# Patient Record
Sex: FEMALE | Race: WHITE | Employment: OTHER | URBAN - METROPOLITAN AREA
[De-identification: names, ages, dates, MRNs, and addresses within clinical notes are randomized per-mention and may not be internally consistent; named-entity substitution may affect disease eponyms.]

---

## 2017-06-27 ENCOUNTER — OFFICE VISIT (OUTPATIENT)
Dept: CARDIOLOGY CLINIC | Age: 77
End: 2017-06-27

## 2017-06-27 VITALS
BODY MASS INDEX: 28.34 KG/M2 | OXYGEN SATURATION: 98 % | HEIGHT: 62 IN | HEART RATE: 76 BPM | WEIGHT: 154 LBS | SYSTOLIC BLOOD PRESSURE: 130 MMHG | DIASTOLIC BLOOD PRESSURE: 80 MMHG | RESPIRATION RATE: 15 BRPM

## 2017-06-27 DIAGNOSIS — I48.20 CHRONIC ATRIAL FIBRILLATION (HCC): Primary | ICD-10-CM

## 2017-06-27 DIAGNOSIS — G47.33 OSA (OBSTRUCTIVE SLEEP APNEA): ICD-10-CM

## 2017-06-27 RX ORDER — PSYLLIUM HUSK 100 %
POWDER (GRAM) MISCELLANEOUS
COMMUNITY
Start: 2016-11-14 | End: 2021-07-13

## 2017-06-27 RX ORDER — AZELASTINE 1 MG/ML
1 SPRAY, METERED NASAL AS NEEDED
COMMUNITY
Start: 2017-05-30

## 2017-06-27 RX ORDER — ALUMINA, MAGNESIA, AND SIMETHICONE 2400; 2400; 240 MG/30ML; MG/30ML; MG/30ML
SUSPENSION ORAL
COMMUNITY
Start: 2016-11-14 | End: 2019-10-25

## 2017-06-27 RX ORDER — DICLOFENAC SODIUM 10 MG/G
4 GEL TOPICAL AS NEEDED
COMMUNITY
Start: 2017-06-23

## 2017-06-27 RX ORDER — POLYETHYLENE GLYCOL 3350 17 G/17G
POWDER, FOR SOLUTION ORAL
COMMUNITY
Start: 2016-11-14 | End: 2019-10-25

## 2017-06-27 NOTE — PROGRESS NOTES
Chief Complaint   Patient presents with    Irregular Heart Beat     A Fib       1. Have you been to the ER, urgent care clinic since your last visit? Hospitalized since your last visit? No    2. Have you seen or consulted any other health care providers outside of the [address] [place] [address] since your last visit? Include any pap smears or colon screening. No    Body mass index is 28.17 kg/(m^2).

## 2017-06-27 NOTE — PROGRESS NOTES
HISTORY OF PRESENT ILLNESS    Sarah Villalpando is a 68 y.o. female. Last seen more than 6 months ago. Problem List  Date Reviewed: 4/6/2016          Codes Class Noted    Bradycardia ICD-10-CM: R00.1  ICD-9-CM: 427.89  12/10/2016        Surgery follow-up ICD-10-CM: W86  ICD-9-CM: V67.00  6/2/2016        Ankle fracture, left ICD-10-CM: L03.818H  ICD-9-CM: 824.8  4/4/2016        NITZA (obstructive sleep apnea) ICD-10-CM: G47.33  ICD-9-CM: 327.23  4/26/2013        Atrial fibrillation ([facility] [address] ICD-10-CM: I48.91  ICD-9-CM: 427.31  Unknown    Overview Signed 2/21/2011  4:06 PM by Dana Andersen MD     Onset 1997  Substrate - unknown. No hx of HTN, diabetes  Stroke prevention - warfarin (Cubbage)                   Cardiac testing   Echo 2d adult 2000 - LVH, EF 60%  Stress test adenosine/cardiolite 2008 - Normal perfusion, EF 69%  Holter 5/18/15 - HR , longest pause 2.3 seconds    HPI     Since her last visit, we reduced Cardizem to every other day dosing, and since then she completely discontinued it at the end of May. Denies any racing or skipped heart beats. No current chest pain. No bleeding issues on Coumadin. She has NITZA and wears CPAP faithfully. She leads an active lifestyle and enjoys doing yard work. Denies any exertional symptoms. No claudication symptoms. Patient denies any exertional chest pain, dyspnea, palpitations, syncope, orthopnea, edema or paroxysmal nocturnal dyspnea. Current Outpatient Prescriptions   Medication Sig Dispense Refill    aluminum & magnesium hydroxide-simethicone (MYLANTA II) 400-400-40 mg/5 mL suspension MYLANTA DOUBLE-STRENGTH 400-400-40 MG/5ML SUSP      azelastine (ASTELIN) 137 mcg (0.1 %) nasal spray 1 [place] by Nasal route.  diclofenac (VOLTAREN) 1 % gel Apply 4 g to affected area.       polyethylene glycol (MIRALAX) 17 gram/dose powder MIRALAX POWD      psyllium husk, bulk, 100 % powd METAMUCIL MULTIHEALTH FIBER PACK      traMADol (ULTRAM) 50 mg tablet Take 1 Tab by mouth every eight (8) hours as needed. Max Daily Amount: 150 mg. Indications: PAIN 20 Tab 0    fish oil-omega-3 fatty acids 340-1,000 mg capsule Take 1 Cap by mouth two (2) times a day.  oxybutynin chloride XL (DITROPAN XL) 15 mg CR tablet Take 15 mg by mouth daily.  potassium chloride SR (KLOR-CON 10) 10 mEq tablet Take 10 mEq by mouth two (2) times a day.  warfarin (COUMADIN) 3 mg tablet Take 3 mg by mouth three (3) days a week. Tuesday, Wednesday, Thursday and Saturday      warfarin (COUMADIN) 6 mg tablet Take 6 mg by mouth two (2) days a week. Sunday, Monday, Friday      atorvastatin (LIPITOR) 20 mg tablet Take 20 mg by mouth daily. Takes with dinner.  ranitidine (ZANTAC) 150 mg tablet Take 150 mg by mouth two (2) times a day.  tiZANidine (ZANAFLEX) 4 mg tablet Take 4 mg by mouth as needed.  magnesium oxide (MAG-OX) 400 mg tablet Take 400 mg by mouth daily (with dinner).  GLUCOSAMINE HCL/CHONDR GA A NA (GLUCOSAMINE-CHONDROITIN) 750-600 mg tab Take 1 Tab by mouth two (2) times a day.  tuauz-fqgtr-bos tea-EGCG-dig#3 50-20-80-40 mg-mcg-mg-mg cap Take 100 mg by mouth daily.  Cholecalciferol, Vitamin D3, (VITAMIN D3) 1,000 unit cap Take 1,000 Units by mouth two (2) times a day.  acetaminophen (TYLENOL ARTHRITIS PAIN) 650 mg CR tablet Take 650 mg by mouth every six (6) hours as needed.  multivitamin (MULTIPLE VITAMINS) tablet Take 1 Tab by mouth daily.  PYRIDOXINE HCL (VITAMIN B-6 PO) Take 100 mg by mouth daily.  CYANOCOBALAMIN, VITAMIN B-12, (VITAMIN B-12 PO) Take 1,000 mg by mouth daily. Allergies   Allergen Reactions    Latex Itching    Adhesive Other (comments)    Codeine Other (comments)     Incoherent/slept     Lives in [facility]. Nonsmoker. Retired. Review of Systems   Constitutional: Negative for chills, weight loss, malaise/fatigue and diaphoresis.    Respiratory: Negative for cough, hemoptysis, sputum production, shortness of breath and wheezing. Cardiovascular: Negative for chest pain, palpitations, orthopnea, claudication, leg swelling and PND. Gastrointestinal: Negative for heartburn, nausea, vomiting, blood in stool and melena. Genitourinary: Negative for dysuria, hematuria and flank pain. Musculoskeletal: Negative for falls. Skin: Negative for rash. Neurological: Negative for focal weakness, seizures, loss of consciousness, weakness and headaches. Endo/Heme/Allergies: Does not bruise/bleed easily. Visit Vitals    /80 (BP 1 Location: Left arm, BP Patient Position: Sitting)    Pulse 76    Resp 15    Ht 5' 2\" (1.575 m)    Wt 154 lb (69.9 kg)    SpO2 98%    BMI 28.17 kg/m2      Wt Readings from Last 3 Encounters:   06/27/17 154 lb (69.9 kg)   12/07/16 156 lb (70.8 kg)   06/01/16 156 lb 4.9 oz (70.9 kg)        Physical Exam   Vitals reviewed. Constitutional: She is oriented to person, place, and time. She appears well-developed. HENT:   Head: Normocephalic. Neck: Neck supple. No JVD present. No tracheal deviation present. Cardiovascular: Normal rate, S1 normal and S2 normal.  An irregularly irregular rhythm present. PMI is not displaced. Exam reveals no gallop and no friction rub. No murmur heard. Pulses:       Carotid pulses are 2+ on the right side, and 2+ on the left side. Abdominal: Soft. Bowel sounds are normal. No tenderness. She has no rebound. Musculoskeletal: She exhibits no edema. Neurological: She is alert and oriented to person, place, and time. Skin: Skin is warm and dry. Psychiatric: She has a normal mood and affect. Cardiographics:  EKG 5/1/14- AF, rate 70s  EKG 5/4/15 - AF, 50s  Holter 5/18/15 - HR , longest pause 2.3 seconds  EKG April 2016 - AF 50-60s   EKG 06/27/17- AF 80s, incomplete RBBB     ASSESSMENT and PLAN  Encounter Diagnoses   Name Primary?     Chronic atrial fibrillation (HCC) Yes    NITZA (obstructive sleep apnea)       Mrs Ariana Jones has chronic AF rate controlled off Cardizem for the past 2 months. 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 I encouraged her to monitor her heart rate periodically. No issues with anitcoagulation. INR levels monitored by Mindy Moyer. Will extend follow up to 1 year. Follow-up Disposition:  Return in about 1 year (around 6/27/2018).      Written by Oli Diana, as dictated by Malia Dent MD.   Malia Dent MD

## 2017-06-27 NOTE — MR AVS SNAPSHOT
Visit Information Date & Time Provider Department Dept. Phone Encounter #  
 6/27/2017  3:00 PM Malia Dent MD [facility] [phone] [id number] Follow-up Instructions Return in about 1 year (around 6/27/2018). Upcoming Health Maintenance Date Due DTaP/Tdap/Td series (1 - Tdap) 12/14/1961 ZOSTER VACCINE AGE 60> 12/14/2000 GLAUCOMA SCREENING Q2Y 12/14/2005 OSTEOPOROSIS SCREENING (DEXA) 12/14/2005 Pneumococcal 65+ Low/Medium Risk (1 of 2 - PCV13) 12/14/2005 MEDICARE YEARLY EXAM 12/14/2005 INFLUENZA AGE 9 TO ADULT 8/1/2017 Allergies as of 6/27/2017  Review Complete On: 6/27/2017 By: Delma Solis LPN Severity Noted Reaction Type Reactions Latex  04/06/2016    Itching Adhesive  06/01/2016    Other (comments) Codeine  01/27/2011   Side Effect Other (comments) Incoherent/slept Current Immunizations  Reviewed on 4/8/2016 No immunizations on file. Not reviewed this visit You Were Diagnosed With   
  
 Codes Comments Chronic atrial fibrillation (HCC)    -  Primary ICD-10-CM: X10.0 ICD-9-CM: 427.31   
 NITZA (obstructive sleep apnea)     ICD-10-CM: G47.33 
ICD-9-CM: 327.23 Vitals BP Pulse Resp Height(growth percentile) Weight(growth percentile) SpO2  
 130/80 (BP 1 Location: Left arm, BP Patient Position: Sitting) 76 15 5' 2\" (1.575 m) 154 lb (69.9 kg) 98% BMI Smoking Status 28.17 kg/m2 Former Smoker Vitals History BMI and BSA Data Body Mass Index Body Surface Area  
 28.17 kg/m 2 1.75 m 2 Preferred Pharmacy Pharmacy Name Phone [address] [facility] [phone] Your Updated Medication List  
  
   
This list is accurate as of: 6/27/17  3:30 PM.  Always use your most recent med list.  
  
  
  
  
 aluminum & magnesium hydroxide-simethicone 400-400-40 mg/5 mL suspension Commonly known as:  MYLANTA II  
MYLANTA DOUBLE-STRENGTH 400-400-40 MG/5ML SUSP  
  
 atorvastatin 20 mg tablet Commonly known as:  LIPITOR Take 20 mg by mouth daily. Takes with dinner. azelastine 137 mcg (0.1 %) nasal spray Commonly known as:  ASTELIN  
1 Spray by Nasal route. diclofenac 1 % Gel Commonly known as:  VOLTAREN Apply 4 g to affected area. fish oil-omega-3 fatty acids 340-1,000 mg capsule Take 1 Cap by mouth two (2) times a day. glucosamine-chondroitin 750-600 mg Tab Take 1 Tab by mouth two (2) times a day. magnesium oxide 400 mg tablet Commonly known as:  MAG-OX Take 400 mg by mouth daily (with dinner). MULTIPLE VITAMINS tablet Generic drug:  multivitamin Take 1 Tab by mouth daily. oxybutynin chloride XL 15 mg CR tablet Commonly known as:  DITROPAN XL Take 15 mg by mouth daily. polyethylene glycol 17 gram/dose powder Commonly known as:  Isabella Marlo MIRALAX POWD  
  
 potassium chloride SR 10 mEq tablet Commonly known as:  KLOR-CON 10 Take 10 mEq by mouth two (2) times a day. psyllium husk (bulk) 100 % Powd METAMUCIL MULTIHEALTH FIBER PACK  
  
 raNITIdine 150 mg tablet Commonly known as:  ZANTAC Take 150 mg by mouth two (2) times a day. tebspbo-pbqjb-gs. tea-EGCG-dig3 50-20-80-40 mg-mcg-mg-mg Cap Take 100 mg by mouth daily. tiZANidine 4 mg tablet Commonly known as:  Alejandro Alves Take 4 mg by mouth as needed. traMADol 50 mg tablet Commonly known as:  ULTRAM  
Take 1 Tab by mouth every eight (8) hours as needed. Max Daily Amount: 150 mg. Indications: PAIN  
  
 TYLENOL ARTHRITIS PAIN 650 mg CR tablet Generic drug:  acetaminophen Take 650 mg by mouth every six (6) hours as needed. VITAMIN B-12 PO Take 1,000 mg by mouth daily. VITAMIN B-6 PO Take 100 mg by mouth daily. VITAMIN D3 1,000 unit Cap Generic drug:  cholecalciferol Take 1,000 Units by mouth two (2) times a day. * warfarin 3 mg tablet Commonly known as:  COUMADIN Take 3 mg by mouth three (3) days a week. Tuesday, Wednesday, Thursday and Saturday * warfarin 6 mg tablet Commonly known as:  COUMADIN Take 6 mg by mouth two (2) days a week. Sunday, Monday, Friday * Notice: This list has 2 medication(s) that are the same as other medications prescribed for you. Read the directions carefully, and ask your doctor or other care provider to review them with you. We Performed the Following AMB POC EKG ROUTINE W/ 12 LEADS, INTER & REP [94146 CPT(R)] Follow-up Instructions Return in about 1 year (around 6/27/2018). Patient Instructions Stay off Cardizem completely. Introducing [facility]! Dear Nadine Al: Thank you for requesting a [employer] account. Our records indicate that you already have an active [employer] account. You can access your account anytime at [email or web address] Did you know that you can access your hospital and ER discharge instructions at any time in [employer]? You can also review all of your test results from your hospital stay or ER visit. Additional Information If you have questions, please visit the Frequently Asked Questions section of the [employer] website at [email or web address] Remember, [employer] is NOT to be used for urgent needs. For medical emergencies, dial 911. Now available from your iPhone and Android! Please provide this summary of care documentation to your next provider. Your primary care clinician is listed as Darrel Sanchez. If you have any questions after today's visit, please call [phone].

## 2017-06-29 ENCOUNTER — TELEPHONE (OUTPATIENT)
Dept: CARDIOLOGY CLINIC | Age: 77
End: 2017-06-29

## 2018-11-13 ENCOUNTER — OFFICE VISIT (OUTPATIENT)
Dept: CARDIOLOGY CLINIC | Age: 78
End: 2018-11-13

## 2018-11-13 VITALS
DIASTOLIC BLOOD PRESSURE: 82 MMHG | RESPIRATION RATE: 16 BRPM | HEART RATE: 84 BPM | SYSTOLIC BLOOD PRESSURE: 144 MMHG | WEIGHT: 148.2 LBS | HEIGHT: 62 IN | BODY MASS INDEX: 27.27 KG/M2

## 2018-11-13 DIAGNOSIS — G47.33 OSA (OBSTRUCTIVE SLEEP APNEA): ICD-10-CM

## 2018-11-13 DIAGNOSIS — I48.20 CHRONIC ATRIAL FIBRILLATION (HCC): Primary | ICD-10-CM

## 2018-11-13 NOTE — PROGRESS NOTES
Visit Vitals  /82 (BP 1 Location: Left arm)   Pulse 84   Resp 16   Ht 5' 2\" (1.575 m)   Wt 148 lb 3.2 oz (67.2 kg)   BMI 27.11 kg/m²       Patient is here for follow. No cardiac complaints. Doing well.

## 2018-11-13 NOTE — PROGRESS NOTES
HISTORY OF PRESENT ILLNESS    Jose Luis Medrano is a 68 y.o. female. Last seen 1.5 years ago. Problem List  Date Reviewed: 11/13/2018          Codes Class Noted    NITZA (obstructive sleep apnea) ICD-10-CM: W71.71  ICD-9-CM: 327.23  4/26/2013        Atrial fibrillation ([facility] [address] ICD-10-CM: I48.91  ICD-9-CM: 427.31  Unknown    Overview Signed 2/21/2011  4:06 PM by Jasvir Gottlieb MD     Onset 1997  Substrate - unknown. No hx of HTN, diabetes  Stroke prevention - warfarin (Cubbage)                   Cardiac testing   Echo 2d adult 2000 - LVH, EF 60%  Stress test adenosine/cardiolite 2008 - Normal perfusion, EF 69%  Holter 5/18/15 - HR , longest pause 2.3 seconds    HPI  Ms. Jordan Ling states she is doing well. She remains active during the day with no exertional symptoms. She was unable to walk as much as she would have liked this summer due to mosquitos and rain. Patient denies any dizziness, lightheadedness, fatigue, exertional chest pain, dyspnea, palpitations, syncope, orthopnea, edema or paroxysmal nocturnal dyspnea. She has had no bleeding concerns on Coumadin. She is followed by Rashida Darling for routine lab work. She is interested in seeing another cardiologist, as she states she is unable to drive to appointments. Current Outpatient Medications   Medication Sig Dispense Refill    acetaminophen (TYLENOL) 325 mg tablet Take  by mouth every four (4) hours as needed for Pain.  azelastine (ASTELIN) 137 mcg (0.1 %) nasal spray 1 [place] by Nasal route as needed.  diclofenac (VOLTAREN) 1 % gel Apply 4 g to affected area as needed.  fish oil-omega-3 fatty acids 340-1,000 mg capsule Take 1 Cap by mouth two (2) times a day.  oxybutynin chloride XL (DITROPAN XL) 15 mg CR tablet Take 15 mg by mouth daily.  potassium chloride SR (KLOR-CON 10) 10 mEq tablet Take 10 mEq by mouth two (2) times a day.  warfarin (COUMADIN) 3 mg tablet Take 3 mg by mouth three (3) days a week.  Sunday, Tuesday, Wednesday, Thursday and Saturday      warfarin (COUMADIN) 6 mg tablet Take 6 mg by mouth two (2) days a week. Monday, Friday      atorvastatin (LIPITOR) 20 mg tablet Take 20 mg by mouth every evening.  ranitidine (ZANTAC) 150 mg tablet Take 150 mg by mouth two (2) times a day.  magnesium oxide (MAG-OX) 400 mg tablet Take 400 mg by mouth daily (with dinner).  GLUCOSAMINE HCL/CHONDR GA A NA (GLUCOSAMINE-CHONDROITIN) 750-600 mg tab Take 1 Tab by mouth two (2) times a day.  Cholecalciferol, Vitamin D3, (VITAMIN D3) 1,000 unit cap Take 1,000 Units by mouth two (2) times a day.  multivitamin (MULTIPLE VITAMINS) tablet Take 1 Tab by mouth daily.  PYRIDOXINE HCL (VITAMIN B-6 PO) Take 100 mg by mouth daily.  CYANOCOBALAMIN, VITAMIN B-12, (VITAMIN B-12 PO) Take 1,000 mg by mouth daily.  aluminum & magnesium hydroxide-simethicone (MYLANTA II) 400-400-40 mg/5 mL suspension MYLANTA DOUBLE-STRENGTH 400-400-40 MG/5ML SUSP      polyethylene glycol (MIRALAX) 17 gram/dose powder MIRALAX POWD      psyllium husk, bulk, 100 % powd METAMUCIL MULTIHEALTH FIBER PACK      traMADol (ULTRAM) 50 mg tablet Take 1 Tab by mouth every eight (8) hours as needed. Max Daily Amount: 150 mg. Indications: PAIN 20 Tab 0    tiZANidine (ZANAFLEX) 4 mg tablet Take 4 mg by mouth as needed.  lvnpt-vlvfl-xcz tea-EGCG-dig#3 50-20-80-40 mg-mcg-mg-mg cap Take 100 mg by mouth daily.  acetaminophen (TYLENOL ARTHRITIS PAIN) 650 mg CR tablet Take 650 mg by mouth every six (6) hours as needed. Allergies   Allergen Reactions    Latex Itching    Adhesive Other (comments)    Codeine Other (comments)     Incoherent/slept     Lives in [facility]. Nonsmoker. Retired. Review of Systems   Constitutional: Negative for chills, weight loss, malaise/fatigue and diaphoresis. Respiratory: Negative for cough, hemoptysis, sputum production, shortness of breath and wheezing.     Cardiovascular: Negative for chest pain, palpitations, orthopnea, claudication, leg swelling and PND. Gastrointestinal: Negative for heartburn, nausea, vomiting, blood in stool and melena. Genitourinary: Negative for dysuria, hematuria and flank pain. Musculoskeletal: Negative for falls. Skin: Negative for rash. Neurological: Negative for focal weakness, seizures, loss of consciousness, weakness and headaches. Endo/Heme/Allergies: Does not bruise/bleed easily. Visit Vitals  /82 (BP 1 Location: Left arm)   Pulse 84   Resp 16   Ht 5' 2\" (1.575 m)   Wt 148 lb 3.2 oz (67.2 kg)   BMI 27.11 kg/m²      Wt Readings from Last 3 Encounters:   11/13/18 148 lb 3.2 oz (67.2 kg)   06/27/17 154 lb (69.9 kg)   12/07/16 156 lb (70.8 kg)        Physical Exam   Vitals reviewed. Constitutional: She is oriented to person, place, and time. She appears well-developed. HENT:   Head: Normocephalic. Neck: Neck supple. No JVD present. No tracheal deviation present. Cardiovascular: Normal rate, S1 normal and S2 normal.  An irregularly irregular rhythm present. PMI is not displaced. Exam reveals no gallop and no friction rub. No murmur heard. Pulses:       Carotid pulses are 2+ on the right side, and 2+ on the left side. Abdominal: Soft. Bowel sounds are normal. No tenderness. She has no rebound. Musculoskeletal: She exhibits no edema. Neurological: She is alert and oriented to person, place, and time. Skin: Skin is warm and dry. Psychiatric: She has a normal mood and affect. Cardiographics:  EKG 5/1/14- AF, rate 70s  EKG 5/4/15 - AF, 50s  Holter 5/18/15 - HR , longest pause 2.3 seconds  EKG April 2016 - AF 50-60s   EKG 06/27/17- AF 80s, incomplete RBBB   EKG 11/13/18 - AF 78    ASSESSMENT and PLAN  Encounter Diagnoses   Name Primary?  Chronic atrial fibrillation (HCC) Yes    NITZA (obstructive sleep apnea)       Mrs Cyril Mason has chronic AF, rate controlled on no AV cruz drugs.  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 I encouraged her to monitor her heart rate periodically. No issues with anitcoagulation. INR levels monitored by Lyn Larson She would like to follow up with a cardiologist in the [address] [place] [address] region. Will coordinate accordingly. Written by Aby Ford, as dictated by Dr. Luis Anderson.      Luis Anderson MD

## 2018-11-16 ENCOUNTER — DOCUMENTATION ONLY (OUTPATIENT)
Dept: CARDIOLOGY CLINIC | Age: 78
End: 2018-11-16

## 2018-11-16 NOTE — PROGRESS NOTES
Cardiology [facility] note:  Asked by provider to help with assisting with coordination to transfer cardiology svs to [facility] provider in [place]. Printed the contact information for [facility]- for Dr. Kristan Dominguez- he sees patients down in the [facility] office in [place]. Mailed this out to her to review, contact for an appointment.

## 2019-10-25 ENCOUNTER — OFFICE VISIT (OUTPATIENT)
Dept: CARDIOLOGY CLINIC | Age: 79
End: 2019-10-25

## 2019-10-25 VITALS
BODY MASS INDEX: 27.42 KG/M2 | HEART RATE: 74 BPM | RESPIRATION RATE: 14 BRPM | SYSTOLIC BLOOD PRESSURE: 136 MMHG | HEIGHT: 62 IN | OXYGEN SATURATION: 96 % | WEIGHT: 149 LBS | DIASTOLIC BLOOD PRESSURE: 78 MMHG

## 2019-10-25 DIAGNOSIS — E78.5 DYSLIPIDEMIA: ICD-10-CM

## 2019-10-25 DIAGNOSIS — G47.33 OSA (OBSTRUCTIVE SLEEP APNEA): ICD-10-CM

## 2019-10-25 DIAGNOSIS — I10 ESSENTIAL HYPERTENSION: ICD-10-CM

## 2019-10-25 DIAGNOSIS — I48.11 LONGSTANDING PERSISTENT ATRIAL FIBRILLATION (HCC): Primary | ICD-10-CM

## 2019-10-25 PROBLEM — K21.00 GASTROESOPHAGEAL REFLUX DISEASE WITH ESOPHAGITIS: Status: ACTIVE | Noted: 2019-10-25

## 2019-10-25 PROBLEM — M15.9 OSTEOARTHRITIS OF MULTIPLE JOINTS: Status: ACTIVE | Noted: 2019-10-25

## 2019-10-25 RX ORDER — ACETAMINOPHEN 325 MG/1
TABLET ORAL
COMMUNITY
End: 2020-09-02 | Stop reason: SDUPTHER

## 2019-10-25 RX ORDER — FAMOTIDINE 20 MG/1
20 TABLET, FILM COATED ORAL 2 TIMES DAILY
COMMUNITY

## 2019-10-25 NOTE — PROGRESS NOTES
Karan Glasgow is a 66 y.o. female is here to establish local cardiac care. Followed at [place] [address] by Dr. Lindsey Douglas, previously followed by Dr. Tra Eduardo for Cardiology in [place]. Hx persistent/chronic afib--rate controlled  (no AV cruz drugs), on anticoagulation, hypertension, dyslipidemia, NITZA, DJD, GERD/Starr's. Recent OV/labs with PCP 10/2/19--chol 121, LDL 52, HDL 59, TG 51 on statin. fishoil. BP well controlled, PT/INR therapeutic on warfarin. Some hip bursiits, arthritis sx (hand). No current CV sx or complaints, other than mild dizziness on occasion, often when first standing up. . Last Holter 2015, no recent Echo. Physically active. The patient denies chest pain/ shortness of breath, orthopnea, PND, LE edema, palpitations, syncope, presyncope or fatigue. Patient Active Problem List    Diagnosis Date Noted    Dyslipidemia 10/25/2019    Osteoarthritis of multiple joints 10/25/2019    Gastroesophageal reflux disease with esophagitis 10/25/2019    HTN (hypertension)     NITZA (obstructive sleep apnea) 04/26/2013    Longstanding persistent atrial fibrillation       Lorenzo Lam  Past Medical History:   Diagnosis Date    Atrial fibrillation ([facility] [address]     onset 1997, chronic. No structural or ischemic heart disease    Starr esophagus     DDD (degenerative disc disease)     DJD (degenerative joint disease)     GERD (gastroesophageal reflux disease)     HTN (hypertension)     Menopause     1981    NITZA (obstructive sleep apnea) dx 20123    CPAP    NITZA (obstructive sleep apnea)       Past Surgical History:   Procedure Laterality Date    ECHO 2D ADULT  2000    LVH, EF 60%    HX OOPHORECTOMY Right     1980    HX OTHER SURGICAL      cyst removed from mouth    STRESS TEST ADENOSINE/MYOVIEW  2008    Normal perfusion, EF 69%     Allergies   Allergen Reactions    Latex Itching    Adhesive Other (comments)    Codeine Other (comments)     Incoherent/slept      History reviewed.  No pertinent family history. Social History     Socioeconomic History    Marital status: SINGLE     Spouse name: Not on file    Number of children: Not on file    Years of education: Not on file    Highest education level: Not on file   Occupational History    Not on file   Social Needs    Financial resource strain: Not on file    Food insecurity:     Worry: Not on file     Inability: Not on file    Transportation needs:     Medical: Not on file     Non-medical: Not on file   Tobacco Use    Smoking status: Former Smoker    Smokeless tobacco: Never Used   Substance and Sexual Activity    Alcohol use: Yes     Alcohol/week: 0.8 standard drinks     Types: 1 Glasses of wine per week     Comment: rare    Drug use: No    Sexual activity: Not on file   Lifestyle    Physical activity:     Days per week: Not on file     Minutes per session: Not on file    Stress: Not on file   Relationships    Social connections:     Talks on phone: Not on file     Gets together: Not on file     Attends Alevism service: Not on file     Active member of club or organization: Not on file     Attends meetings of clubs or organizations: Not on file     Relationship status: Not on file    Intimate partner violence:     Fear of current or ex partner: Not on file     Emotionally abused: Not on file     Physically abused: Not on file     Forced sexual activity: Not on file   Other Topics Concern    Not on file   Social History Narrative    Not on file      Current Outpatient Medications   Medication Sig    acetaminophen (TYLENOL) 325 mg tablet Take  by mouth every four (4) hours as needed for Pain.  famotidine (PEPCID) 20 mg tablet Take 20 mg by mouth two (2) times a day.  acetaminophen (TYLENOL) 325 mg tablet Take  by mouth every four (4) hours as needed for Pain.  azelastine (ASTELIN) 137 mcg (0.1 %) nasal spray 1 [place] by Nasal route as needed.  diclofenac (VOLTAREN) 1 % gel Apply 4 g to affected area as needed.  oxybutynin chloride XL (DITROPAN XL) 15 mg CR tablet Take 15 mg by mouth daily.  potassium chloride SR (KLOR-CON 10) 10 mEq tablet Take 10 mEq by mouth two (2) times a day.  warfarin (COUMADIN) 3 mg tablet Take 3 mg by mouth three (3) days a week. Sunday, Tuesday, Wednesday, Thursday and Saturday    warfarin (COUMADIN) 6 mg tablet Take 6 mg by mouth two (2) days a week. Monday, Friday    atorvastatin (LIPITOR) 20 mg tablet Take 20 mg by mouth every evening.  magnesium oxide (MAG-OX) 400 mg tablet Take 400 mg by mouth daily (with dinner).  Cholecalciferol, Vitamin D3, (VITAMIN D3) 1,000 unit cap Take 1,000 Units by mouth two (2) times a day.  multivitamin (MULTIPLE VITAMINS) tablet Take 1 Tab by mouth daily.  PYRIDOXINE HCL (VITAMIN B-6 PO) Take 100 mg by mouth daily.  CYANOCOBALAMIN, VITAMIN B-12, (VITAMIN B-12 PO) Take 1,000 mg by mouth daily.  psyllium husk, bulk, 100 % powd METAMUCIL MULTIHEALTH FIBER PACK     No current facility-administered medications for this visit. Review of Symptoms:    CONST  No weight change. No fever, chills, sweats    ENT No visual changes, URI sx, sore throat    CV  See HPI   RESP  No cough, or sputum, wheezing. Also see HPI   GI  No abdominal pain or change in bowel habits. No heartburn or dysphagia. No melena or rectal bleeding.   No dysuria, urgency, frequency, hematuria   MSKEL  No joint pain, swelling. No muscle pain. SKIN  No rash or lesions. NEURO  No headache, syncope, or seizure. No weakness, loss of sensation, or paresthesias. PSYCH  No low mood or depression  No anxiety. HE/LYMPH  No easy bruising, abnormal bleeding, or enlarged glands.         Physical ExamPhysical Exam:    Visit Vitals  /78 (BP 1 Location: Left arm, BP Patient Position: Sitting)   Pulse 74   Resp 14   Ht 5' 2\" (1.575 m)   Wt 149 lb (67.6 kg)   SpO2 96%   BMI 27.25 kg/m²     Gen: NAD  HEENT:  PERRL, throat clear  Neck: no adenopathy, no thyromegaly, no JVD   Heart:  irregular,Nl S1S2,  no murmur, gallop or rub.   Lungs:  clear  Abdomen:   Soft, non-tender, bowel sounds are active.   Extremities:  No edema  Pulse: symmetric  Neuro: A&O times 3, No focal neuro deficits    Cardiographics    ECG: afib HR 72, RSR', no change from previous      Labs:   Lab Results   Component Value Date/Time    Sodium 138 04/08/2016 02:14 PM    Sodium 136 04/05/2016 05:51 AM    Potassium 3.9 04/08/2016 02:14 PM    Potassium 3.9 04/05/2016 05:51 AM    Chloride 103 04/08/2016 02:14 PM    Chloride 104 04/05/2016 05:51 AM    CO2 29 04/08/2016 02:14 PM    CO2 26 04/05/2016 05:51 AM    Anion gap 6 04/08/2016 02:14 PM    Anion gap 6 04/05/2016 05:51 AM    Glucose 120 (H) 04/08/2016 02:14 PM    Glucose 108 (H) 04/05/2016 05:51 AM    BUN 12 04/08/2016 02:14 PM    BUN 8 04/05/2016 05:51 AM    Creatinine 0.64 04/08/2016 02:14 PM    Creatinine 0.47 (L) 04/05/2016 05:51 AM    BUN/Creatinine ratio 19 04/08/2016 02:14 PM    BUN/Creatinine ratio 17 04/05/2016 05:51 AM    GFR est AA >60 04/08/2016 02:14 PM    GFR est AA >60 04/05/2016 05:51 AM    GFR est non-AA >60 04/08/2016 02:14 PM    GFR est non-AA >60 04/05/2016 05:51 AM    Calcium 9.2 04/08/2016 02:14 PM    Calcium 9.6 04/05/2016 05:51 AM    Bilirubin, total 0.8 04/05/2016 05:51 AM    AST (SGOT) 23 04/05/2016 05:51 AM    Alk. phosphatase 67 04/05/2016 05:51 AM    Protein, total 7.3 04/05/2016 05:51 AM    Albumin 3.6 04/05/2016 05:51 AM    Globulin 3.7 04/05/2016 05:51 AM    A-G Ratio 1.0 (L) 04/05/2016 05:51 AM    ALT (SGPT) 36 04/05/2016 05:51 AM     No results found for: CPK, CPKX, CPX  No results found for: CHOL, CHOLX, CHLST, CHOLV, 630595, HDL, HDLP, LDL, LDLC, DLDLP, TGLX, TRIGL, TRIGP, CHHD, CHHDX  No results found for this or any previous visit.     Assessment:         Patient Active Problem List    Diagnosis Date Noted    Dyslipidemia 10/25/2019    Osteoarthritis of multiple joints 10/25/2019    Gastroesophageal reflux disease with esophagitis 10/25/2019    HTN (hypertension)     NITZA (obstructive sleep apnea) 04/26/2013    Longstanding persistent atrial fibrillation      Followed at [facility] by Dr. Chapis Rios, previously followed by Dr. Jose Miguel West for Cardiology in [place]. Hx persistent/chronic afib--rate controlled  (no AV cruz drugs), on anticoagulation, hypertension, dyslipidemia, NITZA, DJD, GERD/Starr's. Recent OV/labs with PCP 10/2/19--chol 121, LDL 52, HDL 59, TG 51 on statin. fishoil. BP well controlled, PT/INR therapeutic on warfarin. Some hip bursiits, arthritis sx (hand). . Last Holter 2015, no recent Echo. Mild dizziness at times. Physically active. Plan:     Echo/doppler--persistent to chronic afib, hypertension, NITZA  Holter--persistent to chronic afib, dizziness, not on any cruz blocking drugs (r/o sick sinus, progressive AV cruz disease etc)  Lipids/labs per PCP  Continue current rx incl warfarin   Continue current care and f/u in 6 months.     Cam Migeul MD

## 2019-10-25 NOTE — LETTER
10/25/19 Patient: Trinh Hinojosa YOB: 1940 Date of Visit: 10/25/2019 Gary Love MD 
30 [facility] [address] [place] [address] VIA Facsimile: [phone] Dear Gary Love MD, Thank you for referring Ms. Mckenna Davila to [facility] for evaluation. My notes for this consultation are attached. If you have questions, please do not hesitate to call me. I look forward to following your patient along with you.  
 
 
Sincerely, 
 
Kristal Ramirez MD

## 2019-10-25 NOTE — PROGRESS NOTES
PATIENT ID VERIFIED WITH TWO PATIENT IDENTIFIERS. PATIENT MEDICATIONS REVIEWED AND APPROVED BY DR. Mega Alarcon. MEDICATIONS THAT WERE REMOVED FROM THIS VISIT HAVE BEEN APPROVED BY DR. Mega Alarcon. REMOVED:  TRAMADOL, TYLENOL ARTHRITIS, MYLANTA, FISH OIL-OMEGA 3, GLUCOSAMINE/CHONDROITIN, MIRALAX, ZANTAC, [id number] TEA, TIZANIDINE  Chief Complaint   Patient presents with   [facility] patient evaluation referred by Dr. Tenisha Rizvi Irregular Heart Beat    Hypertension    Cholesterol Problem       1. Have you been to the ER, urgent care clinic since your last visit? Hospitalized since your last visit? New patient evaluation    2. Have you seen or consulted any other health care providers outside of the [address] [place] [address] since your last visit? Include any pap smears or colon screening.  New patient evaluation

## 2019-11-11 ENCOUNTER — TELEPHONE (OUTPATIENT)
Dept: CARDIOLOGY CLINIC | Age: 79
End: 2019-11-11

## 2019-11-11 NOTE — TELEPHONE ENCOUNTER
----- Message from Tiny Mills MD sent at 11/8/2019  3:49 PM EST -----  Regarding: echo  Advise echo shows normal LV pumping function, mild valve abnormalities--no concerns. Left atrium dilated--goes along with long-standing afib. Thanks [facility]    Spoke with the patient. Verified patient with two patient identifiers. Results given and questions answered. Patient verbalized understanding.

## 2019-11-21 ENCOUNTER — CLINICAL SUPPORT (OUTPATIENT)
Dept: CARDIOLOGY CLINIC | Age: 79
End: 2019-11-21

## 2019-11-21 DIAGNOSIS — I48.11 LONGSTANDING PERSISTENT ATRIAL FIBRILLATION (HCC): ICD-10-CM

## 2019-11-21 NOTE — PROGRESS NOTES
24 hour Holter monitor only. Verified patient with two patient identifiers. Pt verbalized understanding of its use. [facility]  Reason: Longstanding persistent atrial fibrillation [I48.11 (ICD-10-CM)]  Start time: 9:48am  Return date: 11/22/19        No LOS.

## 2019-11-22 ENCOUNTER — DOCUMENTATION ONLY (OUTPATIENT)
Dept: CARDIOLOGY CLINIC | Age: 79
End: 2019-11-22

## 2019-11-25 ENCOUNTER — TELEPHONE (OUTPATIENT)
Dept: CARDIOLOGY CLINIC | Age: 79
End: 2019-11-25

## 2019-11-25 NOTE — TELEPHONE ENCOUNTER
----- Message from Houston Sanchez MD sent at 11/25/2019 12:55 PM EST -----  Regarding: Holter  Advise afib throughout with HR , avg 76, occasional PVC's. No clear indication for pacemaker or other rx at this point. May eventually need pacemaker but not at present. Thanks [facility]    6 mo fu needed.

## 2019-11-25 NOTE — TELEPHONE ENCOUNTER
Spoke with the patient. Verified patient with two patient identifiers. Results given and questions answered. Patient verbalized understanding.     Appt:  Tuesday, May 26, 2020 01:00 PM

## 2019-11-25 NOTE — TELEPHONE ENCOUNTER
Patient returned your call please call. She will be at this number for the next hour.   [phone]

## 2019-11-25 NOTE — TELEPHONE ENCOUNTER
----- Message from Ethan Wright MD sent at 11/25/2019 12:55 PM EST ----- Regarding: Holter Advise afib throughout with HR , avg 76, occasional PVC's. No clear indication for pacemaker or other rx at this point. May eventually need pacemaker but not at present. Thanks [facility] 
 
6 mo fu needed.

## 2020-09-02 ENCOUNTER — OFFICE VISIT (OUTPATIENT)
Dept: CARDIOLOGY CLINIC | Age: 80
End: 2020-09-02

## 2020-09-02 VITALS
DIASTOLIC BLOOD PRESSURE: 78 MMHG | WEIGHT: 152 LBS | OXYGEN SATURATION: 98 % | BODY MASS INDEX: 27.97 KG/M2 | TEMPERATURE: 98.1 F | HEIGHT: 62 IN | SYSTOLIC BLOOD PRESSURE: 134 MMHG | HEART RATE: 80 BPM | RESPIRATION RATE: 16 BRPM

## 2020-09-02 DIAGNOSIS — K21.00 GASTROESOPHAGEAL REFLUX DISEASE WITH ESOPHAGITIS: ICD-10-CM

## 2020-09-02 DIAGNOSIS — E78.5 DYSLIPIDEMIA: ICD-10-CM

## 2020-09-02 DIAGNOSIS — M15.9 PRIMARY OSTEOARTHRITIS INVOLVING MULTIPLE JOINTS: ICD-10-CM

## 2020-09-02 DIAGNOSIS — I48.11 LONGSTANDING PERSISTENT ATRIAL FIBRILLATION (HCC): Primary | ICD-10-CM

## 2020-09-02 DIAGNOSIS — G47.33 OSA (OBSTRUCTIVE SLEEP APNEA): ICD-10-CM

## 2020-09-02 DIAGNOSIS — I10 ESSENTIAL HYPERTENSION: ICD-10-CM

## 2020-09-02 NOTE — PROGRESS NOTES
Jeff Meza is a 78 y.o. female is here for routine f/u. Followed at [place] [address] by Dr. Hailee Araya, previously followed by Dr. Kamar Laureano for Cardiology in [place]. Hx persistent/chronic afib--rate controlled  (no AV cruz drugs), on anticoagulation, hypertension, dyslipidemia, NITZA, DJD, GERD/Starr's. Recent OV/labs with PCP 10/2/19--chol 121, LDL 52, HDL 59, TG 51 on statin. fishoil. BP well controlled, PT/INR therapeutic on warfarin. Some hip bursiits, arthritis sx (hand). No current CV sx or complaints, other than mild dizziness on occasion, often when first standing up. . Last Holter 2015, no recent Echo. Physically active. The patient denies chest pain/ shortness of breath, orthopnea, PND, LE edema, palpitations, syncope, presyncope or fatigue. Patient Active Problem List    Diagnosis Date Noted    Dyslipidemia 10/25/2019    Osteoarthritis of multiple joints 10/25/2019    Gastroesophageal reflux disease with esophagitis 10/25/2019    HTN (hypertension)     NITZA (obstructive sleep apnea) 04/26/2013    Longstanding persistent atrial fibrillation ([address]       Luretha Dark  Past Medical History:   Diagnosis Date    Atrial fibrillation ([address]     onset 1997, chronic. No structural or ischemic heart disease    Starr esophagus     DDD (degenerative disc disease)     DJD (degenerative joint disease)     GERD (gastroesophageal reflux disease)     HTN (hypertension)     Menopause     1981    NITZA (obstructive sleep apnea) dx 20123    CPAP    NITZA (obstructive sleep apnea)       Past Surgical History:   Procedure Laterality Date    ECHO 2D ADULT  2000    LVH, EF 60%    HX OOPHORECTOMY Right     1980    HX OTHER SURGICAL      cyst removed from mouth    STRESS TEST ADENOSINE/MYOVIEW  2008    Normal perfusion, EF 69%     Allergies   Allergen Reactions    Latex Itching    Adhesive Other (comments)    Codeine Other (comments)     Incoherent/slept      History reviewed.  No pertinent family history. Social History     Socioeconomic History    Marital status: SINGLE     Spouse name: Not on file    Number of children: Not on file    Years of education: Not on file    Highest education level: Not on file   Occupational History    Not on file   Social Needs    Financial resource strain: Not on file    Food insecurity     Worry: Not on file     Inability: Not on file    Transportation needs     Medical: Not on file     Non-medical: Not on file   Tobacco Use    Smoking status: Former Smoker     Packs/day: 1.50     Types: Cigarettes     Last attempt to quit:      Years since quittin.6    Smokeless tobacco: Never Used   Substance and Sexual Activity    Alcohol use: Yes     Alcohol/week: 0.8 standard drinks     Types: 1 Glasses of wine per week     Comment: rare    Drug use: No    Sexual activity: Not on file   Lifestyle    Physical activity     Days per week: Not on file     Minutes per session: Not on file    Stress: Not on file   Relationships    Social connections     Talks on phone: Not on file     Gets together: Not on file     Attends Congregational service: Not on file     Active member of club or organization: Not on file     Attends meetings of clubs or organizations: Not on file     Relationship status: Not on file    Intimate partner violence     Fear of current or ex partner: Not on file     Emotionally abused: Not on file     Physically abused: Not on file     Forced sexual activity: Not on file   Other Topics Concern    Not on file   Social History Narrative    Not on file      Current Outpatient Medications   Medication Sig    famotidine (PEPCID) 20 mg tablet Take 20 mg by mouth two (2) times a day.  acetaminophen (TYLENOL PO) Take 650 mg by mouth every four (4) hours as needed for Pain.  diclofenac (VOLTAREN) 1 % gel Apply 4 g to affected area as needed.     psyllium husk, bulk, 100 % powd METAMUCIL MULTIHEALTH FIBER PACK    oxybutynin chloride XL (DITROPAN XL) 15 mg CR tablet Take 15 mg by mouth daily.  potassium chloride SR (KLOR-CON 10) 10 mEq tablet Take 10 mEq by mouth two (2) times a day.  warfarin (COUMADIN) 3 mg tablet Take 3 mg by mouth three (3) days a week. Sunday, Tuesday, Wednesday, Thursday and Saturday    warfarin (COUMADIN) 6 mg tablet Take 6 mg by mouth two (2) days a week. Monday, Friday    atorvastatin (LIPITOR) 20 mg tablet Take 20 mg by mouth every evening.  magnesium oxide (MAG-OX) 400 mg tablet Take 400 mg by mouth daily (with dinner).  Cholecalciferol, Vitamin D3, (VITAMIN D3) 1,000 unit cap Take 1,000 Units by mouth two (2) times a day.  multivitamin (MULTIPLE VITAMINS) tablet Take 1 Tab by mouth daily.  PYRIDOXINE HCL (VITAMIN B-6 PO) Take 100 mg by mouth daily.  CYANOCOBALAMIN, VITAMIN B-12, (VITAMIN B-12 PO) Take 1,000 mg by mouth daily.  azelastine (ASTELIN) 137 mcg (0.1 %) nasal spray 1 [place] by Nasal route as needed. No current facility-administered medications for this visit. Review of Symptoms:    CONST  No weight change. No fever, chills, sweats    ENT No visual changes, URI sx, sore throat    CV  See HPI   RESP  No cough, or sputum, wheezing. Also see HPI   GI  No abdominal pain or change in bowel habits. No heartburn or dysphagia. No melena or rectal bleeding.   No dysuria, urgency, frequency, hematuria   MSKEL  No joint pain, swelling. No muscle pain. SKIN  No rash or lesions. NEURO  No headache, syncope, or seizure. No weakness, loss of sensation, or paresthesias. PSYCH  No low mood or depression  No anxiety. HE/LYMPH  No easy bruising, abnormal bleeding, or enlarged glands.         Physical ExamPhysical Exam:    Visit Vitals  /78 (BP 1 Location: Left arm, BP Patient Position: Sitting)   Pulse 80   Temp 98.1 °F (36.7 °C) (Temporal)   Resp 16   Ht 5' 2\" (1.575 m)   Wt 152 lb (68.9 kg)   SpO2 98% Comment: ra   BMI 27.80 kg/m²     Gen: NAD  HEENT:  PERRL, throat clear  Neck: no adenopathy, no thyromegaly, no JVD   Heart:  irregular,Nl S1S2,  no murmur, gallop or rub. Lungs:  clear  Abdomen:   Soft, non-tender, bowel sounds are active. Extremities:  No edema  Pulse: symmetric  Neuro: A&O times 3, No focal neuro deficits    Cardiographics    ECG: afib 70, NSST, no acute changes    Labs:   Lab Results   Component Value Date/Time    Sodium 138 04/08/2016 02:14 PM    Sodium 136 04/05/2016 05:51 AM    Potassium 3.9 04/08/2016 02:14 PM    Potassium 3.9 04/05/2016 05:51 AM    Chloride 103 04/08/2016 02:14 PM    Chloride 104 04/05/2016 05:51 AM    CO2 29 04/08/2016 02:14 PM    CO2 26 04/05/2016 05:51 AM    Anion gap 6 04/08/2016 02:14 PM    Anion gap 6 04/05/2016 05:51 AM    Glucose 120 (H) 04/08/2016 02:14 PM    Glucose 108 (H) 04/05/2016 05:51 AM    BUN 12 04/08/2016 02:14 PM    BUN 8 04/05/2016 05:51 AM    Creatinine 0.64 04/08/2016 02:14 PM    Creatinine 0.47 (L) 04/05/2016 05:51 AM    BUN/Creatinine ratio 19 04/08/2016 02:14 PM    BUN/Creatinine ratio 17 04/05/2016 05:51 AM    GFR est AA >60 04/08/2016 02:14 PM    GFR est AA >60 04/05/2016 05:51 AM    GFR est non-AA >60 04/08/2016 02:14 PM    GFR est non-AA >60 04/05/2016 05:51 AM    Calcium 9.2 04/08/2016 02:14 PM    Calcium 9.6 04/05/2016 05:51 AM    Bilirubin, total 0.8 04/05/2016 05:51 AM    Alk. phosphatase 67 04/05/2016 05:51 AM    Protein, total 7.3 04/05/2016 05:51 AM    Albumin 3.6 04/05/2016 05:51 AM    Globulin 3.7 04/05/2016 05:51 AM    A-G Ratio 1.0 (L) 04/05/2016 05:51 AM    ALT (SGPT) 36 04/05/2016 05:51 AM     No results found for: CPK, CPKX, CPX  No results found for: CHOL, CHOLX, CHLST, CHOLV, 587012, HDL, HDLP, LDL, LDLC, DLDLP, TGLX, TRIGL, TRIGP, CHHD, CHHDX  No results found for this or any previous visit.     Assessment:         Patient Active Problem List    Diagnosis Date Noted    Dyslipidemia 10/25/2019    Osteoarthritis of multiple joints 10/25/2019    Gastroesophageal reflux disease with esophagitis 10/25/2019    HTN (hypertension)     NITZA (obstructive sleep apnea) 04/26/2013    Longstanding persistent atrial fibrillation (HCC)       Followed at [facility] by Dr. Sal Zhong, previously followed by Dr. Leticia Santiago for Cardiology in [place]. Hx persistent/chronic afib--rate controlled  (no AV cruz drugs), on anticoagulation, hypertension, dyslipidemia, NITZA, DJD, GERD/Starr's. Recent OV/labs with PCP 10/2/19--chol 121, LDL 52, HDL 59, TG 51 on statin. fishoil. BP well controlled, PT/INR therapeutic on warfarin. Some hip bursiits, arthritis sx (hand). No current CV sx or complaints, other than mild dizziness on occasion, often when first standing up. . Last Holter 2015, no recent Echo. Physically active. Plan:     Doing well with no adverse cardiac symptoms. Rate controlled afib on [address]   Lipids and labs followed by PCP. Continue current care and f/u in 6 months.     Gino Call MD

## 2020-09-02 NOTE — PROGRESS NOTES
Verified patient with two patient identifiers. Medications reviewed/approved by Dr. Jae Arenas. A verbal order from Dr. Jae Arenas was received with [facility] to remove any medications that were deleted during the visit.    Medication(s) removed:  acetaminophen (TYLENOL) 325 mg tablet

## 2020-09-02 NOTE — LETTER
9/2/20 Patient: Milton Doctor YOB: 1940 Date of Visit: 9/2/2020 Lashonda Milian MD 
30 [facility] [address] [place] [address] VIA Facsimile: [phone] Dear Lashonda Milian MD, Thank you for referring Ms. Kristel Whitney to [facility] for evaluation. My notes for this consultation are attached. If you have questions, please do not hesitate to call me. I look forward to following your patient along with you.  
 
 
Sincerely, 
 
Lashanda Morrell MD

## 2021-04-12 ENCOUNTER — OFFICE VISIT (OUTPATIENT)
Dept: CARDIOLOGY CLINIC | Age: 81
End: 2021-04-12
Payer: MEDICARE

## 2021-04-12 VITALS
OXYGEN SATURATION: 98 % | WEIGHT: 145 LBS | HEART RATE: 97 BPM | BODY MASS INDEX: 26.68 KG/M2 | HEIGHT: 62 IN | SYSTOLIC BLOOD PRESSURE: 132 MMHG | TEMPERATURE: 97.5 F | DIASTOLIC BLOOD PRESSURE: 86 MMHG | RESPIRATION RATE: 16 BRPM

## 2021-04-12 DIAGNOSIS — G47.33 OSA (OBSTRUCTIVE SLEEP APNEA): ICD-10-CM

## 2021-04-12 DIAGNOSIS — M15.9 OSTEOARTHRITIS OF MULTIPLE JOINTS, UNSPECIFIED OSTEOARTHRITIS TYPE: ICD-10-CM

## 2021-04-12 DIAGNOSIS — E78.5 DYSLIPIDEMIA: ICD-10-CM

## 2021-04-12 DIAGNOSIS — K21.00 GASTROESOPHAGEAL REFLUX DISEASE WITH ESOPHAGITIS WITHOUT HEMORRHAGE: ICD-10-CM

## 2021-04-12 DIAGNOSIS — I48.11 LONGSTANDING PERSISTENT ATRIAL FIBRILLATION (HCC): Primary | ICD-10-CM

## 2021-04-12 DIAGNOSIS — I10 ESSENTIAL HYPERTENSION: ICD-10-CM

## 2021-04-12 PROCEDURE — G8510 SCR DEP NEG, NO PLAN REQD: HCPCS | Performed by: INTERNAL MEDICINE

## 2021-04-12 PROCEDURE — G8427 DOCREV CUR MEDS BY ELIG CLIN: HCPCS | Performed by: INTERNAL MEDICINE

## 2021-04-12 PROCEDURE — G8419 CALC BMI OUT NRM PARAM NOF/U: HCPCS | Performed by: INTERNAL MEDICINE

## 2021-04-12 PROCEDURE — 93000 ELECTROCARDIOGRAM COMPLETE: CPT | Performed by: INTERNAL MEDICINE

## 2021-04-12 PROCEDURE — G8752 SYS BP LESS 140: HCPCS | Performed by: INTERNAL MEDICINE

## 2021-04-12 PROCEDURE — 1101F PT FALLS ASSESS-DOCD LE1/YR: CPT | Performed by: INTERNAL MEDICINE

## 2021-04-12 PROCEDURE — 99214 OFFICE O/P EST MOD 30 MIN: CPT | Performed by: INTERNAL MEDICINE

## 2021-04-12 PROCEDURE — G8754 DIAS BP LESS 90: HCPCS | Performed by: INTERNAL MEDICINE

## 2021-04-12 PROCEDURE — G8400 PT W/DXA NO RESULTS DOC: HCPCS | Performed by: INTERNAL MEDICINE

## 2021-04-12 PROCEDURE — G8536 NO DOC ELDER MAL SCRN: HCPCS | Performed by: INTERNAL MEDICINE

## 2021-04-12 PROCEDURE — 1090F PRES/ABSN URINE INCON ASSESS: CPT | Performed by: INTERNAL MEDICINE

## 2021-04-12 NOTE — LETTER
4/12/2021 Patient: Steve Lundborg YOB: 1940 Date of Visit: 4/12/2021 Fouzia Li MD 
30 [facility] [address] [facility] [address] Via Fax: [phone] Dear Fouzia Li MD, Thank you for referring Ms. Miguel Kaur to [facility] for evaluation. My notes for this consultation are attached. If you have questions, please do not hesitate to call me. I look forward to following your patient along with you.  
 
 
Sincerely, 
 
Jonna Vazquez MD

## 2021-04-12 NOTE — PROGRESS NOTES
Chief Complaint   Patient presents with    Irregular Heart Beat     Follow up    Hypertension     Verified patient with two patient identifiers. Medications reviewed/approved by Dr. Breanna Egan. 1. Have you been to the ER, urgent care clinic since your last visit? Hospitalized since your last visit?no    2. Have you seen or consulted any other health care providers outside of the [address] [place] [address] since your last visit? Include any pap smears or colon screening.  no

## 2021-04-12 NOTE — PROGRESS NOTES
Maria D Copeland is a [de-identified] y.o. female is here for routine f/u. Followed at [facility] by Dr. Vi Martin, previously followed by Dr. Ammy Napoles for Cardiology in [place].  Hx persistent/chronic afib--rate controlled  (no AV cruz drugs), on anticoagulation, hypertension, dyslipidemia, NITZA, DJD, GERD/Starr's.  Recent OV/labs with PCP 10/2/19--chol 121, LDL 52, HDL 59, TG 51 on statin. fishoil.  BP well controlled, PT/INR therapeutic on warfarin. Some hip bursiits, arthritis sx (hand). No current CV sx or complaints, other than mild dizziness on occasion, often when first standing up. . Last Holter 2015, no recent Echo. Physically active.   The patient denies chest pain/ shortness of breath, orthopnea, PND, LE edema, palpitations, syncope, presyncope or fatigue. Patient Active Problem List    Diagnosis Date Noted    Dyslipidemia 10/25/2019    Osteoarthritis of multiple joints 10/25/2019    Gastroesophageal reflux disease with esophagitis 10/25/2019    HTN (hypertension)     NITZA (obstructive sleep apnea) 04/26/2013    Longstanding persistent atrial fibrillation ([address]       Zina Murcia  Past Medical History:   Diagnosis Date    Atrial fibrillation ([address]     onset 1997, chronic. No structural or ischemic heart disease    Starr esophagus     DDD (degenerative disc disease)     DJD (degenerative joint disease)     GERD (gastroesophageal reflux disease)     HTN (hypertension)     Menopause     1981    NITZA (obstructive sleep apnea) dx 20123    CPAP    NITZA (obstructive sleep apnea)       Past Surgical History:   Procedure Laterality Date    ECHO 2D ADULT  2000    LVH, EF 60%    HX OOPHORECTOMY Right     1980    HX OTHER SURGICAL      cyst removed from mouth    STRESS TEST ADENOSINE/MYOVIEW  2008    Normal perfusion, EF 69%     Allergies   Allergen Reactions    Latex Itching    Adhesive Other (comments)    Codeine Other (comments)     Incoherent/slept      No family history on file. Social History     Socioeconomic History    Marital status: SINGLE     Spouse name: Not on file    Number of children: Not on file    Years of education: Not on file    Highest education level: Not on file   Occupational History    Not on file   Social Needs    Financial resource strain: Not on file    Food insecurity     Worry: Not on file     Inability: Not on file    Transportation needs     Medical: Not on file     Non-medical: Not on file   Tobacco Use    Smoking status: Former Smoker     Packs/day: 1.50     Types: Cigarettes     Quit date:      Years since quittin.2    Smokeless tobacco: Never Used   Substance and Sexual Activity    Alcohol use: Yes     Alcohol/week: 0.8 standard drinks     Types: 1 Glasses of wine per week     Comment: rare    Drug use: No    Sexual activity: Not on file   Lifestyle    Physical activity     Days per week: Not on file     Minutes per session: Not on file    Stress: Not on file   Relationships    Social connections     Talks on phone: Not on file     Gets together: Not on file     Attends [employer] service: Not on file     Active member of club or organization: Not on file     Attends meetings of clubs or organizations: Not on file     Relationship status: Not on file    Intimate partner violence     Fear of current or ex partner: Not on file     Emotionally abused: Not on file     Physically abused: Not on file     Forced sexual activity: Not on file   Other Topics Concern    Not on file   Social History Narrative    Not on file      Current Outpatient Medications   Medication Sig    famotidine (PEPCID) 20 mg tablet Take 20 mg by mouth two (2) times a day.  acetaminophen (TYLENOL PO) Take 650 mg by mouth every four (4) hours as needed for Pain.  azelastine (ASTELIN) 137 mcg (0.1 %) nasal spray 1 [place] by Nasal route as needed.  diclofenac (VOLTAREN) 1 % gel Apply 4 g to affected area as needed.     psyllium husk, bulk, 100 % powd METAMUCIL MULTIHEALTH FIBER PACK    oxybutynin chloride XL (DITROPAN XL) 15 mg CR tablet Take 15 mg by mouth daily.  potassium chloride SR (KLOR-CON 10) 10 mEq tablet Take 10 mEq by mouth two (2) times a day.  warfarin (COUMADIN) 3 mg tablet Take 3 mg by mouth three (3) days a week. Sunday, Tuesday, Wednesday, Thursday and Saturday    warfarin (COUMADIN) 6 mg tablet Take 6 mg by mouth two (2) days a week. Monday, Friday    atorvastatin (LIPITOR) 20 mg tablet Take 20 mg by mouth every evening.  magnesium oxide (MAG-OX) 400 mg tablet Take 400 mg by mouth daily (with dinner).  Cholecalciferol, Vitamin D3, (VITAMIN D3) 1,000 unit cap Take 1,000 Units by mouth two (2) times a day.  multivitamin (MULTIPLE VITAMINS) tablet Take 1 Tab by mouth daily.  PYRIDOXINE HCL (VITAMIN B-6 PO) Take 100 mg by mouth daily.  CYANOCOBALAMIN, VITAMIN B-12, (VITAMIN B-12 PO) Take 1,000 mg by mouth daily. No current facility-administered medications for this visit. Review of Symptoms:    CONST  No weight change. No fever, chills, sweats    ENT No visual changes, URI sx, sore throat    CV  See HPI   RESP  No cough, or sputum, wheezing. Also see HPI   GI  No abdominal pain or change in bowel habits. No heartburn or dysphagia. No melena or rectal bleeding.   No dysuria, urgency, frequency, hematuria   MSKEL  No joint pain, swelling. No muscle pain. SKIN  No rash or lesions. NEURO  No headache, syncope, or seizure. No weakness, loss of sensation, or paresthesias. PSYCH  No low mood or depression  No anxiety. HE/LYMPH  No easy bruising, abnormal bleeding, or enlarged glands.         Physical ExamPhysical Exam:    Visit Vitals  /86 (BP 1 Location: Left upper arm, BP Patient Position: Sitting, BP Cuff Size: Adult)   Pulse 97   Temp 97.5 °F (36.4 °C) (Temporal)   Resp 16   Ht 5' 2\" (1.575 m)   Wt 145 lb (65.8 kg)   SpO2 98%   BMI 26.52 kg/m²     Gen: NAD  HEENT:  PERRL, throat clear  Neck: no adenopathy, no thyromegaly, no JVD   Heart:  irregular,Nl S1S2,  no murmur, gallop or rub. Lungs:  clear  Abdomen:   Soft, non-tender, bowel sounds are active. Extremities:  No edema  Pulse: symmetric  Neuro: A&O times 3, No focal neuro deficits    Cardiographics    ECG: afib 50, RSR', no acute changes      Assessment:         Patient Active Problem List    Diagnosis Date Noted    Dyslipidemia 10/25/2019    Osteoarthritis of multiple joints 10/25/2019    Gastroesophageal reflux disease with esophagitis 10/25/2019    HTN (hypertension)     NITZA (obstructive sleep apnea) 04/26/2013    Longstanding persistent atrial fibrillation (HCC)      Followed at [facility] by Dr. Erasto Guzmán, previously followed by Dr. Fredo Carranza for Cardiology in [place].  Hx persistent/chronic afib--rate controlled  (no AV cruz drugs), on anticoagulation, hypertension, dyslipidemia, NITZA, DJD, GERD/Starr's.  Recent OV/labs with PCP 10/2/19--chol 121, LDL 52, HDL 59, TG 51 on statin. fishoil.  BP well controlled, PT/INR therapeutic on warfarin. Some hip bursiits, arthritis sx (hand). No current CV sx or complaints, other than mild dizziness on occasion, often when first standing up. . Last Holter 2015, no recent Echo. Physically active.       Plan:     Doing well with no adverse cardiac symptoms. PT/INR, Lipids and labs followed by PCP. Continue current care and f/u in 6 months.     Daryl Allen MD

## 2021-07-09 ENCOUNTER — TELEPHONE (OUTPATIENT)
Dept: CARDIOLOGY CLINIC | Age: 81
End: 2021-07-09

## 2021-07-09 NOTE — TELEPHONE ENCOUNTER
Verified patient with two patient identifiers. CPAP machine is not fitting properply per the pt and causing paroxysmal nocturnal dyspnea. Pt wasn't able to sleep much last night and \"needs tjhis to be fixed! \" Advised call sleep MD Anibal Matos and CPAP company. Pt wanting to see cardio as well. Scheduled an appt for her to see Dr. Jung Wolf on 7/14. Pt will call cpap company now for adjustments.

## 2021-07-13 ENCOUNTER — OFFICE VISIT (OUTPATIENT)
Dept: CARDIOLOGY CLINIC | Age: 81
End: 2021-07-13
Payer: MEDICARE

## 2021-07-13 VITALS
OXYGEN SATURATION: 96 % | HEART RATE: 98 BPM | HEIGHT: 62 IN | BODY MASS INDEX: 26.5 KG/M2 | WEIGHT: 144 LBS | RESPIRATION RATE: 16 BRPM | DIASTOLIC BLOOD PRESSURE: 82 MMHG | SYSTOLIC BLOOD PRESSURE: 142 MMHG | TEMPERATURE: 96.9 F

## 2021-07-13 DIAGNOSIS — R06.02 SHORTNESS OF BREATH: Primary | ICD-10-CM

## 2021-07-13 DIAGNOSIS — R07.89 TIGHT CHEST: ICD-10-CM

## 2021-07-13 DIAGNOSIS — I10 ESSENTIAL HYPERTENSION: ICD-10-CM

## 2021-07-13 DIAGNOSIS — I48.11 LONGSTANDING PERSISTENT ATRIAL FIBRILLATION (HCC): ICD-10-CM

## 2021-07-13 DIAGNOSIS — G47.33 OSA (OBSTRUCTIVE SLEEP APNEA): ICD-10-CM

## 2021-07-13 PROCEDURE — 93000 ELECTROCARDIOGRAM COMPLETE: CPT | Performed by: INTERNAL MEDICINE

## 2021-07-13 PROCEDURE — G8427 DOCREV CUR MEDS BY ELIG CLIN: HCPCS | Performed by: INTERNAL MEDICINE

## 2021-07-13 PROCEDURE — 1101F PT FALLS ASSESS-DOCD LE1/YR: CPT | Performed by: INTERNAL MEDICINE

## 2021-07-13 PROCEDURE — 1090F PRES/ABSN URINE INCON ASSESS: CPT | Performed by: INTERNAL MEDICINE

## 2021-07-13 PROCEDURE — G8400 PT W/DXA NO RESULTS DOC: HCPCS | Performed by: INTERNAL MEDICINE

## 2021-07-13 PROCEDURE — G8753 SYS BP > OR = 140: HCPCS | Performed by: INTERNAL MEDICINE

## 2021-07-13 PROCEDURE — G8754 DIAS BP LESS 90: HCPCS | Performed by: INTERNAL MEDICINE

## 2021-07-13 PROCEDURE — 99214 OFFICE O/P EST MOD 30 MIN: CPT | Performed by: INTERNAL MEDICINE

## 2021-07-13 PROCEDURE — G8419 CALC BMI OUT NRM PARAM NOF/U: HCPCS | Performed by: INTERNAL MEDICINE

## 2021-07-13 PROCEDURE — G8536 NO DOC ELDER MAL SCRN: HCPCS | Performed by: INTERNAL MEDICINE

## 2021-07-13 PROCEDURE — G8510 SCR DEP NEG, NO PLAN REQD: HCPCS | Performed by: INTERNAL MEDICINE

## 2021-07-13 NOTE — PROGRESS NOTES
Identified pt with two pt identifiers(name and ). Reviewed record in preparation for visit and have obtained necessary documentation. Chief Complaint   Patient presents with    Breathing Problem     Spoke with the pt last week and pt advised the following: CPAP machine is not fitting properply and causing paroxysmal nocturnal dyspnea. Advised call sleep MD Shawna Denny) and CPAP company. Pt reports new cpap yest. and slept better.  Shortness of Breath     While rooming the pt today she report that she was NOT short of breath but her chest was tight that particular night. No chest tightness while cleaning house or walking dog.  Irregular Heart Beat    Hypertension      Vitals:    21 1144   BP: (!) 160/84   Pulse: 98   Resp: 16   Temp: 96.9 °F (36.1 °C)   TempSrc: Temporal   SpO2: 96%   Weight: 144 lb (65.3 kg)   Height: 5' 2\" (1.575 m)   PainSc:   0 - No pain       Medications reviewed/approved by Dr. Anabell Santillan. Health Maintenance Review: Patient reminded of \"due or due soon\" health maintenance. I have asked the patient to contact his/her primary care provider (PCP) for follow-up on his/her health maintenance. Coordination of Care Questionnaire:  :   1) Have you been to an emergency room, urgent care, or hospitalized since your last visit? If yes, where when, and reason for visit? no       2. Have seen or consulted any other health care provider since your last visit? If yes, where when, and reason for visit? YES pcp md lakeisha for r/c. Sleep MD Dr. Nova Knight for [place]. Patient is accompanied by self I have received verbal consent from Lianna Sanches to discuss any/all medical information while they are present in the room.

## 2021-07-13 NOTE — PROGRESS NOTES
Angelita Schmidt is a [de-identified] y.o. female is here for symptom-based f/u--more dyspnea, CPAP not working/fitting, PND, orthopnea. .  Followed at [facility] by Dr. Matt Rubio, previously followed by Dr. Momo Ramos for Cardiology in [place].  Hx persistent/chronic afib--rate controlled  (no AV cruz drugs), on anticoagulation, hypertension, dyslipidemia, NITZA, DJD, GERD/Starr's.  Recent OV/labs with PCP 10/2/19--chol 121, LDL 52, HDL 59, TG 51 on statin. fishoil.  BP well controlled, PT/INR therapeutic on warfarin. Some hip bursiits, arthritis sx (hand). Last Holter 2015. ECHO 11/19:  · Left Ventricle: Normal cavity size, wall thickness, systolic function (ejection fraction normal) and diastolic function. Estimated left ventricular ejection fraction is 66 - 70%. Visually measured ejection fraction. · Left Atrium: Severely dilated left atrium. · Mitral Valve: Trace mitral valve regurgitation is present. · Pulmonary Artery: There is no evidence of pulmonary hypertension. · Right Ventricle: Moderately reduced systolic function  The patient denies chest pain, LE edema, palpitations, syncope, presyncope or fatigue. Patient Active Problem List    Diagnosis Date Noted    Dyslipidemia 10/25/2019    Osteoarthritis of multiple joints 10/25/2019    Gastroesophageal reflux disease with esophagitis 10/25/2019    HTN (hypertension)     NITZA (obstructive sleep apnea) 04/26/2013    Longstanding persistent atrial fibrillation ([address]       [facility]  Past Medical History:   Diagnosis Date    Atrial fibrillation ([address]     onset 1997, chronic.  No structural or ischemic heart disease    Starr esophagus     DDD (degenerative disc disease)     DJD (degenerative joint disease)     GERD (gastroesophageal reflux disease)     HTN (hypertension)     Menopause     1981    NITZA (obstructive sleep apnea) dx 20123    CPAP    NITZA (obstructive sleep apnea)       Past Surgical History:   Procedure Laterality Date  ECHO 2D ADULT  2000    LVH, EF 60%    HX OOPHORECTOMY Right     1980    HX OTHER SURGICAL      cyst removed from mouth    STRESS TEST ADENOSINE/MYOVIEW  2008    Normal perfusion, EF 69%     Allergies   Allergen Reactions    Latex Itching    Adhesive Other (comments)    Codeine Other (comments)     Incoherent/slept      No family history on file. Social History     Socioeconomic History    Marital status: SINGLE     Spouse name: Not on file    Number of children: Not on file    Years of education: Not on file    Highest education level: Not on file   Occupational History    Not on file   Tobacco Use    Smoking status: Former Smoker     Packs/day: 1.50     Types: Cigarettes     Quit date:      Years since quittin.5    Smokeless tobacco: Never Used   Substance and Sexual Activity    Alcohol use: Yes     Alcohol/week: 0.8 standard drinks     Types: 1 Glasses of wine per week     Comment: rare    Drug use: No    Sexual activity: Not on file   Other Topics Concern    Not on file   Social History Narrative    Not on file     Social Determinants of Health     Financial Resource Strain:     Difficulty of Paying Living Expenses:    Food Insecurity:     Worried About Running Out of Food in the Last Year:     [address] in the Last Year:    Transportation Needs:     Lack of Transportation (Medical):      Lack of Transportation (Non-Medical):    Physical Activity:     Days of Exercise per Week:     Minutes of Exercise per Session:    Stress:     Feeling of Stress :    Social Connections:     Frequency of Communication with Friends and Family:     Frequency of Social Gatherings with Friends and Family:     Attends [employer] Services:     Active Member of Clubs or Organizations:     Attends Club or Organization Meetings:     Marital Status:    Intimate Partner Violence:     Fear of Current or Ex-Partner:     Emotionally Abused:     Physically Abused:     Sexually Abused: Current Outpatient Medications   Medication Sig    famotidine (PEPCID) 20 mg tablet Take 20 mg by mouth two (2) times a day.  acetaminophen (TYLENOL PO) Take 650 mg by mouth every four (4) hours as needed for Pain.  azelastine (ASTELIN) 137 mcg (0.1 %) nasal spray 1 [place] by Nasal route as needed.  diclofenac (VOLTAREN) 1 % gel Apply 4 g to affected area as needed.  oxybutynin chloride XL (DITROPAN XL) 15 mg CR tablet Take 15 mg by mouth daily.  potassium chloride SR (KLOR-CON 10) 10 mEq tablet Take 10 mEq by mouth two (2) times a day.  warfarin (COUMADIN) 3 mg tablet Take  by mouth. As directed by coumadin clinic.  warfarin (COUMADIN) 6 mg tablet Take  by mouth. As directed by coumadin clinic.  atorvastatin (LIPITOR) 20 mg tablet Take 20 mg by mouth every evening.  magnesium oxide (MAG-OX) 400 mg tablet Take 400 mg by mouth daily (with dinner).  Cholecalciferol, Vitamin D3, (VITAMIN D3) 1,000 unit cap Take 1,000 Units by mouth two (2) times a day.  multivitamin (MULTIPLE VITAMINS) tablet Take 1 Tab by mouth daily.  PYRIDOXINE HCL (VITAMIN B-6 PO) Take 100 mg by mouth daily.  CYANOCOBALAMIN, VITAMIN B-12, (VITAMIN B-12 PO) Take 1,000 mg by mouth daily.  psyllium husk, bulk, 100 % powd METAMUCIL MULTIHEALTH FIBER PACK (Patient not taking: Reported on 7/13/2021)     No current facility-administered medications for this visit. Review of Symptoms:    CONST  No weight change. No fever, chills, sweats    ENT No visual changes, URI sx, sore throat    CV  See HPI   RESP  No cough, or sputum, wheezing. Also see HPI   GI  No abdominal pain or change in bowel habits. No heartburn or dysphagia. No melena or rectal bleeding.   No dysuria, urgency, frequency, hematuria   MSKEL  No joint pain, swelling. No muscle pain. SKIN  No rash or lesions. NEURO  No headache, syncope, or seizure. No weakness, loss of sensation, or paresthesias.     PSYCH  No low mood or depression  No anxiety. HE/LYMPH  No easy bruising, abnormal bleeding, or enlarged glands. Physical ExamPhysical Exam:    Visit Vitals  BP (!) 142/82 (BP 1 Location: Left upper arm, BP Patient Position: Sitting, BP Cuff Size: Adult)   Pulse 98   Temp 96.9 °F (36.1 °C) (Temporal)   Resp 16   Ht 5' 2\" (1.575 m)   Wt 144 lb (65.3 kg)   SpO2 96% Comment: ra   BMI 26.34 kg/m²     Gen: NAD  HEENT:  PERRL, throat clear  Neck: no adenopathy, no thyromegaly, no JVD   Heart:  irregular,Nl S1S2,  I/VI murmur, no gallop or rub. Lungs:  clear  Abdomen:   Soft, non-tender, bowel sounds are active. Extremities:  No edema  Pulse: symmetric  Neuro: A&O times 3, No focal neuro deficits    Cardiographics    ECG: afib, HR 69, RSR\"      Assessment:         Patient Active Problem List    Diagnosis Date Noted    Dyslipidemia 10/25/2019    Osteoarthritis of multiple joints 10/25/2019    Gastroesophageal reflux disease with esophagitis 10/25/2019    HTN (hypertension)     NITZA (obstructive sleep apnea) 04/26/2013    Longstanding persistent atrial fibrillation ([address]         Plan:     Doing well with no adverse cardiac symptoms. Lipids and labs followed by PCP. Continue current care and f/u in 6 months.     Geovanny Salcido MD

## 2022-01-11 NOTE — PROGRESS NOTES
Mina Garcia NP      Subjective/HPI:     Miladis Lim is a 80 y.o. female is here for f/u appt. Last seen in the office 7/13/21. Today being seen by enhanced virtual visit. Past med Hx persistent/chronic afib--rate controlled  (no AV cruz drugs)- on anticoagulation, hypertension, dyslipidemia, NITZA, DJD, and GERD/Starr's. She denies chest pain, SOB/GOODE, orthopnea, PND, or LE edema. Denies palpitations, syncope, presyncope or fatigue. Doing well. PCP Provider  Laureen Purvis  Past Medical History:   Diagnosis Date    Atrial fibrillation ([address]     onset 1997, chronic. No structural or ischemic heart disease    Starr esophagus     DDD (degenerative disc disease)     DJD (degenerative joint disease)     GERD (gastroesophageal reflux disease)     HTN (hypertension)     Menopause     1981    NITZA (obstructive sleep apnea) dx 20123    CPAP    NITZA (obstructive sleep apnea)       Past Surgical History:   Procedure Laterality Date    ECHO 2D ADULT  2000    LVH, EF 60%    HX OOPHORECTOMY Right     1980    HX OTHER SURGICAL      cyst removed from mouth    STRESS TEST ADENOSINE/MYOVIEW  2008    Normal perfusion, EF 69%     Allergies   Allergen Reactions    Latex Itching    Adhesive Other (comments)    Codeine Other (comments)     Incoherent/slept      No family history on file. Current Outpatient Medications   Medication Sig    famotidine (PEPCID) 20 mg tablet Take 20 mg by mouth two (2) times a day.  acetaminophen (TYLENOL PO) Take 650 mg by mouth every four (4) hours as needed for Pain.  azelastine (ASTELIN) 137 mcg (0.1 %) nasal spray 1 [place] by Nasal route as needed.  diclofenac (VOLTAREN) 1 % gel Apply 4 g to affected area as needed.  oxybutynin chloride XL (DITROPAN XL) 15 mg CR tablet Take 15 mg by mouth daily.  potassium chloride SR (KLOR-CON 10) 10 mEq tablet Take 10 mEq by mouth two (2) times a day.     warfarin (COUMADIN) 3 mg tablet Take  by mouth. As directed by coumadin clinic.  warfarin (COUMADIN) 6 mg tablet Take  by mouth. As directed by coumadin clinic.  atorvastatin (LIPITOR) 20 mg tablet Take 20 mg by mouth every evening.  magnesium oxide (MAG-OX) 400 mg tablet Take 400 mg by mouth daily (with dinner).  Cholecalciferol, Vitamin D3, (VITAMIN D3) 1,000 unit cap Take 1,000 Units by mouth two (2) times a day.  multivitamin (MULTIPLE VITAMINS) tablet Take 1 Tab by mouth daily.  PYRIDOXINE HCL (VITAMIN B-6 PO) Take 100 mg by mouth daily.  CYANOCOBALAMIN, VITAMIN B-12, (VITAMIN B-12 PO) Take 1,000 mg by mouth daily. No current facility-administered medications for this visit. There were no vitals filed for this visit. Social History     Socioeconomic History    Marital status: SINGLE     Spouse name: Not on file    Number of children: Not on file    Years of education: Not on file    Highest education level: Not on file   Occupational History    Not on file   Tobacco Use    Smoking status: Former Smoker     Packs/day: 1.50     Types: Cigarettes     Quit date:      Years since quittin.0    Smokeless tobacco: Never Used   Substance and Sexual Activity    Alcohol use: Yes     Alcohol/week: 0.8 standard drinks     Types: 1 Glasses of wine per week     Comment: rare    Drug use: No    Sexual activity: Not on file   Other Topics Concern    Not on file   Social History Narrative    Not on file     Social Determinants of Health     Financial Resource Strain:     Difficulty of Paying Living Expenses: Not on file   Food Insecurity:     Worried About Running Out of Food in the Last Year: Not on file    Krupa of Food in the Last Year: Not on file   Transportation Needs:     Lack of Transportation (Medical): Not on file    Lack of Transportation (Non-Medical):  Not on file   Physical Activity:     Days of Exercise per Week: Not on file    Minutes of Exercise per Session: Not on file Stress:     Feeling of Stress : Not on file   Social Connections:     Frequency of Communication with Friends and Family: Not on file    Frequency of Social Gatherings with Friends and Family: Not on file    Attends Nondenominational Services: Not on file    Active Member of [address] [place] [address] [employer] or Organizations: Not on file    Attends Club or Organization Meetings: Not on file    Marital Status: Not on file   Intimate Partner Violence:     Fear of Current or Ex-Partner: Not on file    Emotionally Abused: Not on file    Physically Abused: Not on file    Sexually Abused: Not on file   Housing Stability:     Unable to Pay for Housing in the Last Year: Not on file    Number of [place] in the Last Year: Not on file    Unstable Housing in the Last Year: Not on file       I have reviewed the nurses notes, vitals, problem list, allergy list, medical history, family, social history and medications. Review of Symptoms:  11 systems reviewed, negative other than as stated in the HPI    VS: obtained in MDO- 98.6- HR 70- 122/84 sat 98%, 142lbs. Physical Exam:    General PE  Gen:  NAD  Mental Status - Alert. General Appearance - Not in acute distress. HEENT:  PER, EOM, no JVD  Chest and Lung Exam   Inspection: Accessory muscles - No use of accessory muscles in breathing. No audible wheezing. Normal effort in breathing. Symmetric excursion. Cardiovascular:  No JVD  Upper Extremity: Inspection - Bilateral - No Cyanotic nailbeds or Digital clubbing. Lower Extremity:   Palpation: Edema - Bilateral - No edema. Neuro: A&O times 3, CN and motor grossly WNL  Skin: no rashes or lesions on exposed skin, dry, intact  Psych: normal mood, affect. Speech clear. Cardiographics    ECG: atrial fibrillation, HR 74    ECHO 11/19:  · Left Ventricle: Normal cavity size, wall thickness, systolic function (ejection fraction normal) and diastolic function. Estimated left ventricular ejection fraction is 66 - 70%.  Visually measured ejection fraction. · Left Atrium: Severely dilated left atrium. · Mitral Valve: Trace mitral valve regurgitation is present. · Pulmonary Artery: There is no evidence of pulmonary hypertension. · Right Ventricle: Moderately reduced systolic function    Holter monitor 11/2019 afib throughout, HR , avg 76    Cardiology Labs:  No results found for: FLP, CHOL, HDL, LDLC, VLDL, CHHD  Lab Results   Component Value Date/Time    Sodium 138 04/08/2016 02:14 PM    Potassium 3.9 04/08/2016 02:14 PM    Chloride 103 04/08/2016 02:14 PM    CO2 29 04/08/2016 02:14 PM    Glucose 120 (H) 04/08/2016 02:14 PM    BUN 12 04/08/2016 02:14 PM    Creatinine 0.64 04/08/2016 02:14 PM    BUN/Creatinine ratio 19 04/08/2016 02:14 PM    GFR est AA >60 04/08/2016 02:14 PM    GFR est non-AA >60 04/08/2016 02:14 PM    Calcium 9.2 04/08/2016 02:14 PM    Anion gap 6 04/08/2016 02:14 PM    Bilirubin, total 0.8 04/05/2016 05:51 AM    ALT (SGPT) 36 04/05/2016 05:51 AM    Alk. phosphatase 67 04/05/2016 05:51 AM    Protein, total 7.3 04/05/2016 05:51 AM    Albumin 3.6 04/05/2016 05:51 AM    Globulin 3.7 04/05/2016 05:51 AM    A-G Ratio 1.0 (L) 04/05/2016 05:51 AM     No results found for: HBA1C, [id number], [id number]     Assessment:         ICD-10-CM ICD-9-CM    1. Longstanding persistent atrial fibrillation (HCC)  I48.11 427.31    2. Essential hypertension  I10 401.9    3. Dyslipidemia  E78.5 272.4           Plan:   Denies symptoms today of racing HR, palpitations that are worsening. Maintaining rate controlled afib on ECG, HR 74. BP normotensive at 122/84. PCP managing lipids/labs/INR-1/14/22 LDL 66, Creatinine 0.44, CBC WNL. Continue current med management for AFIB, hyperlipidemia. No medications for BP    F/U in 6 months. ELIUD Arauz, was evaluated through a hybrid synchronous (real-time) audio-video encounter. The patient (or guardian if applicable) is aware that this is a billable service.  Verbal consent to proceed has been obtained within the past 12 months. The visit was conducted pursuant to the emergency declaration under the [address] [place] [address] and the [employer] General Act. Patient identification was verified, and a caregiver was present when appropriate. The patient was located in our [place] [address] office where a nurse obtained VS and EKG and I was present in our [address] location where I am credentialed to provide care. On this date 1/11/2022  I have spent 32 minutes reviewing previous notes, test results and face to face with the patient discussing the diagnosis and importance of compliance with the treatment plan as well as documenting on the day of the visit.

## 2022-01-18 ENCOUNTER — VIRTUAL VISIT (OUTPATIENT)
Dept: CARDIOLOGY CLINIC | Age: 82
End: 2022-01-18
Payer: MEDICARE

## 2022-01-18 DIAGNOSIS — I48.11 LONGSTANDING PERSISTENT ATRIAL FIBRILLATION (HCC): Primary | ICD-10-CM

## 2022-01-18 DIAGNOSIS — E78.5 DYSLIPIDEMIA: ICD-10-CM

## 2022-01-18 DIAGNOSIS — I10 ESSENTIAL HYPERTENSION: ICD-10-CM

## 2022-01-18 PROCEDURE — G8427 DOCREV CUR MEDS BY ELIG CLIN: HCPCS | Performed by: NURSE PRACTITIONER

## 2022-01-18 PROCEDURE — G8400 PT W/DXA NO RESULTS DOC: HCPCS | Performed by: NURSE PRACTITIONER

## 2022-01-18 PROCEDURE — 1101F PT FALLS ASSESS-DOCD LE1/YR: CPT | Performed by: NURSE PRACTITIONER

## 2022-01-18 PROCEDURE — G8756 NO BP MEASURE DOC: HCPCS | Performed by: NURSE PRACTITIONER

## 2022-01-18 PROCEDURE — G8419 CALC BMI OUT NRM PARAM NOF/U: HCPCS | Performed by: NURSE PRACTITIONER

## 2022-01-18 PROCEDURE — 99214 OFFICE O/P EST MOD 30 MIN: CPT | Performed by: NURSE PRACTITIONER

## 2022-01-18 PROCEDURE — G8432 DEP SCR NOT DOC, RNG: HCPCS | Performed by: NURSE PRACTITIONER

## 2022-01-18 PROCEDURE — 93000 ELECTROCARDIOGRAM COMPLETE: CPT | Performed by: NURSE PRACTITIONER

## 2022-01-18 PROCEDURE — 1090F PRES/ABSN URINE INCON ASSESS: CPT | Performed by: NURSE PRACTITIONER

## 2022-01-18 PROCEDURE — G8536 NO DOC ELDER MAL SCRN: HCPCS | Performed by: NURSE PRACTITIONER

## 2022-01-18 NOTE — PROGRESS NOTES
Identified pt with two pt identifiers(name and ). Reviewed record in preparation for visit and have obtained necessary documentation. Chief Complaint   Patient presents with    Irregular Heart Beat     6 month follow up    Hypertension      All vitals were obtained manually. Patient-Reported Systolic (Top) 119   Patient-Reported Diastolic (Bottom) 84   BP Observation    Patient-Reported Pulse 70   Patient-Reported Temperature 98.6F   Patient-Reported Weight 142lb   Patient-Reported Pulse Oximetry 98%Patient-Reported Pulse Oximetry. 98%. The comment is ra. Taken on 22 1435         Medications reviewed/approved by provider. Health Maintenance Review: Patient reminded of \"due or due soon\" health maintenance. I have asked the patient to contact his/her primary care provider (PCP) for follow-up on his/her health maintenance. Coordination of Care Questionnaire:  :   1) Have you been to an emergency room, urgent care, or hospitalized since your last visit? If yes, where when, and reason for visit? no       2. Have seen or consulted any other health care provider since your last visit? If yes, where when, and reason for visit? NO      Patient is accompanied by self I have received verbal consent from Miladis Lim to discuss any/all medical information while they are present in the room.

## 2022-02-14 ENCOUNTER — HOSPITAL ENCOUNTER (OUTPATIENT)
Dept: MAMMOGRAPHY | Age: 82
Discharge: HOME | End: 2022-02-14
Attending: FAMILY MEDICINE
Payer: MEDICARE

## 2022-02-14 VITALS — BODY MASS INDEX: 25.79 KG/M2 | WEIGHT: 141 LBS

## 2022-02-14 DIAGNOSIS — Z12.31 VISIT FOR SCREENING MAMMOGRAM: ICD-10-CM

## 2022-02-14 PROCEDURE — 77063 BREAST TOMOSYNTHESIS BI: CPT

## 2022-03-18 PROBLEM — E78.5 DYSLIPIDEMIA: Status: ACTIVE | Noted: 2019-10-25

## 2022-03-18 PROBLEM — M15.9 OSTEOARTHRITIS OF MULTIPLE JOINTS: Status: ACTIVE | Noted: 2019-10-25

## 2022-03-19 PROBLEM — K21.00 GASTROESOPHAGEAL REFLUX DISEASE WITH ESOPHAGITIS: Status: ACTIVE | Noted: 2019-10-25

## 2022-04-21 ENCOUNTER — TELEPHONE (OUTPATIENT)
Dept: CARDIOLOGY CLINIC | Age: 82
End: 2022-04-21

## 2022-06-28 NOTE — PROGRESS NOTES
Alek Rutledge is a 80 y.o. female is here for follow up. Past med Hx persistent/chronic afib--rate controlled  (no AV cruz drugs)- on anticoagulation, hypertension, dyslipidemia, NITZA, DJD, and GERD/Starr's    Last seen by William Garner NP via VV in 1/2022:    Doing well from CV standpoint. Continues to see PCP who follows her INR. The patient denies chest pain/ shortness of breath, orthopnea, PND, LE edema, palpitations, syncope, presyncope or fatigue. Patient Active Problem List    Diagnosis Date Noted    Dyslipidemia 10/25/2019    Osteoarthritis of multiple joints 10/25/2019    Gastroesophageal reflux disease with esophagitis 10/25/2019    HTN (hypertension)     NITZA (obstructive sleep apnea) 04/26/2013    Longstanding persistent atrial fibrillation ([address]       Ryder Nolasco  Past Medical History:   Diagnosis Date    Atrial fibrillation ([address]     onset 1997, chronic. No structural or ischemic heart disease    Starr esophagus     DDD (degenerative disc disease)     DJD (degenerative joint disease)     GERD (gastroesophageal reflux disease)     HTN (hypertension)     Menopause     1981    NITZA (obstructive sleep apnea) dx 20123    CPAP    NITZA (obstructive sleep apnea)       Past Surgical History:   Procedure Laterality Date    ECHO 2D ADULT  2000    LVH, EF 60%    HX OOPHORECTOMY Right     1980    HX OTHER SURGICAL      cyst removed from mouth    STRESS TEST ADENOSINE/MYOVIEW  2008    Normal perfusion, EF 69%     Allergies   Allergen Reactions    Latex Itching    Adhesive Other (comments)    Codeine Other (comments)     Incoherent/slept      No family history on file.    Social History     Socioeconomic History    Marital status: SINGLE     Spouse name: Not on file    Number of children: Not on file    Years of education: Not on file    Highest education level: Not on file   Occupational History    Not on file   Tobacco Use    Smoking status: Former Smoker Packs/day: 1.50     Types: Cigarettes     Quit date:      Years since quittin.5    Smokeless tobacco: Never Used   Substance and Sexual Activity    Alcohol use: Yes     Alcohol/week: 0.8 standard drinks     Types: 1 Glasses of wine per week     Comment: rare    Drug use: No    Sexual activity: Not on file   Other Topics Concern    Not on file   Social History Narrative    Not on file     Social Determinants of Health     Financial Resource Strain:     Difficulty of Paying Living Expenses: Not on file   Food Insecurity:     Worried About Running Out of Food in the Last Year: Not on file    Krupa of Food in the Last Year: Not on file   Transportation Needs:     Lack of Transportation (Medical): Not on file    Lack of Transportation (Non-Medical): Not on file   Physical Activity:     Days of Exercise per Week: Not on file    Minutes of Exercise per Session: Not on file   Stress:     Feeling of Stress : Not on file   Social Connections:     Frequency of Communication with Friends and Family: Not on file    Frequency of Social Gatherings with Friends and Family: Not on file    Attends [employer] Services: Not on file    Active Member of [address] [place] [address] [employer] or Organizations: Not on file    Attends Club or Organization Meetings: Not on file    Marital Status: Not on file   Intimate Partner Violence:     Fear of Current or Ex-Partner: Not on file    Emotionally Abused: Not on file    Physically Abused: Not on file    Sexually Abused: Not on file   Housing Stability:     Unable to Pay for Housing in the Last Year: Not on file    Number of [place] in the Last Year: Not on file    Unstable Housing in the Last Year: Not on file      Current Outpatient Medications   Medication Sig    famotidine (PEPCID) 20 mg tablet Take 20 mg by mouth two (2) times a day. (Patient not taking: Reported on 2022)    acetaminophen (TYLENOL PO) Take 650 mg by mouth every four (4) hours as needed for Pain.     azelastine (ASTELIN) 137 mcg (0.1 %) nasal spray 1 [place] by Nasal route as needed. (Patient not taking: Reported on 1/18/2022)    diclofenac (VOLTAREN) 1 % gel Apply 4 g to affected area as needed. (Patient not taking: Reported on 1/18/2022)    oxybutynin chloride XL (DITROPAN XL) 15 mg CR tablet Take 15 mg by mouth daily.  potassium chloride SR (KLOR-CON 10) 10 mEq tablet Take 10 mEq by mouth two (2) times a day.  warfarin (COUMADIN) 3 mg tablet Take  by mouth. As directed by coumadin clinic.  warfarin (COUMADIN) 6 mg tablet Take 3 mg by mouth. As directed by coumadin clinic. 1.5 MG MON & FRI    atorvastatin (LIPITOR) 20 mg tablet Take 20 mg by mouth every evening.  magnesium oxide (MAG-OX) 400 mg tablet Take 400 mg by mouth daily (with dinner).  Cholecalciferol, Vitamin D3, (VITAMIN D3) 1,000 unit cap Take 1,000 Units by mouth two (2) times a day.  multivitamin (MULTIPLE VITAMINS) tablet Take 1 Tab by mouth daily.  PYRIDOXINE HCL (VITAMIN B-6 PO) Take 100 mg by mouth daily.  CYANOCOBALAMIN, VITAMIN B-12, (VITAMIN B-12 PO) Take 1,000 mg by mouth daily. No current facility-administered medications for this visit. Review of Symptoms:    CONST  No weight change. No fever, chills, sweats    ENT No visual changes, URI sx, sore throat    CV  See HPI   RESP  No cough, or sputum, wheezing. Also see HPI   GI  No abdominal pain or change in bowel habits. No heartburn or dysphagia. No melena or rectal bleeding.   No dysuria, urgency, frequency, hematuria   MSKEL  No joint pain, swelling. No muscle pain. SKIN  No rash or lesions. NEURO  No headache, syncope, or seizure. No weakness, loss of sensation, or paresthesias. PSYCH  No low mood or depression  No anxiety. HE/LYMPH  No easy bruising, abnormal bleeding, or enlarged glands. Physical ExamPhysical Exam:    There were no vitals taken for this visit.   Gen: NAD  HEENT:  PERRL, throat clear  Neck: no adenopathy, no thyromegaly, no JVD   Heart:  irregular,Nl S1S2,  I/VI murmur, no gallop or rub. Lungs:  clear  Abdomen:   Soft, non-tender, bowel sounds are active. Extremities:  No edema  Pulse: symmetric  Neuro: A&O times 3, No focal neuro deficits    Cardiographics    ECG: afib, HR 81, RSR       Assessment:         Patient Active Problem List    Diagnosis Date Noted    Dyslipidemia 10/25/2019    Osteoarthritis of multiple joints 10/25/2019    Gastroesophageal reflux disease with esophagitis 10/25/2019    HTN (hypertension)     NITZA (obstructive sleep apnea) 04/26/2013    Longstanding persistent atrial fibrillation ([facility]      [facility] 7/2021  symptom-based f/u--more dyspnea, CPAP not working/fitting, PND, orthopnea. .  Followed at [facility] by Dr. Ary Mccabe, previously followed by Dr. Verona Schilder for Cardiology in [place].  Hx persistent/chronic afib--rate controlled  (no AV cruz drugs), on anticoagulation, hypertension, dyslipidemia, NITZA, DJD, GERD/Starr's.  Recent OV/labs with PCP 10/2/19--chol 121, LDL 52, HDL 59, TG 51 on statin. fishoil.  BP well controlled, PT/INR therapeutic on warfarin. Some hip bursiits, arthritis sx (hand). Last Holter 2015. ECHO 11/19:  · Left Ventricle: Normal cavity size, wall thickness, systolic function (ejection fraction normal) and diastolic function. Estimated left ventricular ejection fraction is 66 - 70%. Visually measured ejection fraction. · Left Atrium: Severely dilated left atrium. · Mitral Valve: Trace mitral valve regurgitation is present. · Pulmonary Artery: There is no evidence of pulmonary hypertension. · Right Ventricle: Moderately reduced systolic function       Plan:     Chronic afib  EF 65% severe LAE trace MR per echo in 11/2019  Continue Warfarin.   INR per PCP  Repeat echo at follow up    HTN  Controlled with current therapy    HLD  1/2022 LDL 66 On statin Labs and lipids per PCP    NITZA  On PAP therapy    GERD  On famotidine      Continue current care and f/u in 6 months.     Brent Mcleod, NP

## 2022-06-30 ENCOUNTER — OFFICE VISIT (OUTPATIENT)
Dept: CARDIOLOGY CLINIC | Age: 82
End: 2022-06-30
Payer: MEDICARE

## 2022-06-30 VITALS
HEIGHT: 62 IN | BODY MASS INDEX: 26.5 KG/M2 | TEMPERATURE: 97.1 F | OXYGEN SATURATION: 97 % | RESPIRATION RATE: 18 BRPM | SYSTOLIC BLOOD PRESSURE: 120 MMHG | HEART RATE: 81 BPM | DIASTOLIC BLOOD PRESSURE: 80 MMHG | WEIGHT: 144 LBS

## 2022-06-30 DIAGNOSIS — R06.02 SHORTNESS OF BREATH: ICD-10-CM

## 2022-06-30 DIAGNOSIS — E78.2 MIXED HYPERLIPIDEMIA: ICD-10-CM

## 2022-06-30 DIAGNOSIS — E78.5 DYSLIPIDEMIA: ICD-10-CM

## 2022-06-30 DIAGNOSIS — I10 ESSENTIAL HYPERTENSION: ICD-10-CM

## 2022-06-30 DIAGNOSIS — G47.33 OSA (OBSTRUCTIVE SLEEP APNEA): ICD-10-CM

## 2022-06-30 DIAGNOSIS — I48.11 LONGSTANDING PERSISTENT ATRIAL FIBRILLATION (HCC): Primary | ICD-10-CM

## 2022-06-30 DIAGNOSIS — K21.9 GASTROESOPHAGEAL REFLUX DISEASE, UNSPECIFIED WHETHER ESOPHAGITIS PRESENT: ICD-10-CM

## 2022-06-30 PROCEDURE — G8400 PT W/DXA NO RESULTS DOC: HCPCS | Performed by: NURSE PRACTITIONER

## 2022-06-30 PROCEDURE — 1123F ACP DISCUSS/DSCN MKR DOCD: CPT | Performed by: NURSE PRACTITIONER

## 2022-06-30 PROCEDURE — G8752 SYS BP LESS 140: HCPCS | Performed by: NURSE PRACTITIONER

## 2022-06-30 PROCEDURE — G8417 CALC BMI ABV UP PARAM F/U: HCPCS | Performed by: NURSE PRACTITIONER

## 2022-06-30 PROCEDURE — 99214 OFFICE O/P EST MOD 30 MIN: CPT | Performed by: NURSE PRACTITIONER

## 2022-06-30 PROCEDURE — G8432 DEP SCR NOT DOC, RNG: HCPCS | Performed by: NURSE PRACTITIONER

## 2022-06-30 PROCEDURE — 1101F PT FALLS ASSESS-DOCD LE1/YR: CPT | Performed by: NURSE PRACTITIONER

## 2022-06-30 PROCEDURE — 93000 ELECTROCARDIOGRAM COMPLETE: CPT | Performed by: INTERNAL MEDICINE

## 2022-06-30 PROCEDURE — G8536 NO DOC ELDER MAL SCRN: HCPCS | Performed by: NURSE PRACTITIONER

## 2022-06-30 PROCEDURE — G8427 DOCREV CUR MEDS BY ELIG CLIN: HCPCS | Performed by: NURSE PRACTITIONER

## 2022-06-30 PROCEDURE — 1090F PRES/ABSN URINE INCON ASSESS: CPT | Performed by: NURSE PRACTITIONER

## 2022-06-30 PROCEDURE — G8754 DIAS BP LESS 90: HCPCS | Performed by: NURSE PRACTITIONER

## 2022-06-30 RX ORDER — TRAMADOL HYDROCHLORIDE 50 MG/1
50 TABLET ORAL
COMMUNITY

## 2022-06-30 NOTE — PROGRESS NOTES
Identified pt with two pt identifiers(name and ). Reviewed record in preparation for visit and have obtained necessary documentation. Chief Complaint   Patient presents with    Irregular Heart Beat     6 month follow up    Hypertension      Vitals:    22 1327   BP: 120/80   Pulse: 81   Resp: 18   Temp: 97.1 °F (36.2 °C)   TempSrc: Temporal   SpO2: 97%   Weight: 144 lb (65.3 kg)   Height: 5' 2\" (1.575 m)   PainSc:   3   PainLoc: Foot       Medications reviewed/approved by provider. Health Maintenance Review: Patient reminded of \"due or due soon\" health maintenance. I have asked the patient to contact his/her primary care provider (PCP) for follow-up on his/her health maintenance. Coordination of Care Questionnaire:  :   1) Have you been to an emergency room, urgent care, or hospitalized since your last visit? If yes, where when, and reason for visit? no       2. Have seen or consulted any other health care provider since your last visit? If yes, where when, and reason for visit? YES Dr. Adam Ibarra PCP      Patient is accompanied by self I have received verbal consent from Dee Rasheed to discuss any/all medical information while they are present in the room.

## 2023-02-22 ENCOUNTER — TRANSCRIBE ORDER (OUTPATIENT)
Dept: SCHEDULING | Age: 83
End: 2023-02-22

## 2023-02-22 DIAGNOSIS — M21.961 ACQUIRED DEFORMITY OF RIGHT KNEE: Primary | ICD-10-CM

## 2023-02-24 ENCOUNTER — HOSPITAL ENCOUNTER (OUTPATIENT)
Dept: MAMMOGRAPHY | Age: 83
Discharge: HOME | End: 2023-02-24
Attending: FAMILY MEDICINE
Payer: MEDICARE

## 2023-02-24 VITALS — BODY MASS INDEX: 26.34 KG/M2 | WEIGHT: 144 LBS

## 2023-02-24 DIAGNOSIS — Z12.31 VISIT FOR SCREENING MAMMOGRAM: ICD-10-CM

## 2023-02-24 PROCEDURE — 77063 BREAST TOMOSYNTHESIS BI: CPT

## 2023-03-23 ENCOUNTER — HOSPITAL ENCOUNTER (OUTPATIENT)
Dept: CT IMAGING | Age: 83
Discharge: HOME | End: 2023-03-23
Attending: ORTHOPAEDIC SURGERY
Payer: COMMERCIAL

## 2023-03-23 DIAGNOSIS — M21.961 ACQUIRED DEFORMITY OF RIGHT KNEE: ICD-10-CM

## 2023-03-23 PROCEDURE — 73700 CT LOWER EXTREMITY W/O DYE: CPT

## 2023-05-21 RX ORDER — MAGNESIUM OXIDE 400 MG/1
400 TABLET ORAL
COMMUNITY

## 2023-05-21 RX ORDER — ATORVASTATIN CALCIUM 20 MG/1
20 TABLET, FILM COATED ORAL EVERY EVENING
COMMUNITY

## 2023-05-21 RX ORDER — WARFARIN SODIUM 3 MG/1
4.5 TABLET ORAL
COMMUNITY

## 2023-05-21 RX ORDER — AZELASTINE 1 MG/ML
1 SPRAY, METERED NASAL PRN
COMMUNITY
Start: 2017-05-30

## 2023-05-21 RX ORDER — WARFARIN SODIUM 6 MG/1
3 TABLET ORAL
COMMUNITY

## 2023-05-21 RX ORDER — TRAMADOL HYDROCHLORIDE 50 MG/1
50 TABLET ORAL EVERY 6 HOURS PRN
Status: ON HOLD | COMMUNITY
End: 2023-07-05 | Stop reason: SDUPTHER

## 2023-05-21 RX ORDER — FAMOTIDINE 20 MG/1
20 TABLET, FILM COATED ORAL AS NEEDED
COMMUNITY

## 2023-05-21 RX ORDER — POTASSIUM CHLORIDE 750 MG/1
10 TABLET, FILM COATED, EXTENDED RELEASE ORAL 2 TIMES DAILY
COMMUNITY

## 2023-05-21 RX ORDER — OXYBUTYNIN CHLORIDE 15 MG/1
15 TABLET, EXTENDED RELEASE ORAL DAILY
COMMUNITY

## 2023-06-27 ENCOUNTER — HOSPITAL ENCOUNTER (OUTPATIENT)
Facility: HOSPITAL | Age: 83
Discharge: HOME | End: 2023-06-30
Payer: MEDICARE

## 2023-06-27 VITALS
WEIGHT: 135.36 LBS | DIASTOLIC BLOOD PRESSURE: 88 MMHG | SYSTOLIC BLOOD PRESSURE: 166 MMHG | RESPIRATION RATE: 20 BRPM | OXYGEN SATURATION: 100 % | BODY MASS INDEX: 25.56 KG/M2 | TEMPERATURE: 97.5 F | HEART RATE: 73 BPM | HEIGHT: 61 IN

## 2023-06-27 LAB
ABO + RH BLD: NORMAL
ALBUMIN SERPL-MCNC: 4.5 G/DL (ref 3.5–5)
ALBUMIN/GLOB SERPL: 1.1 (ref 1.1–2.2)
ALP SERPL-CCNC: 84 U/L (ref 45–117)
ALT SERPL-CCNC: 36 U/L (ref 12–78)
ANION GAP SERPL CALC-SCNC: 5 MMOL/L (ref 5–15)
APPEARANCE UR: CLEAR
AST SERPL-CCNC: 21 U/L (ref 15–37)
BACTERIA URNS QL MICRO: NEGATIVE /HPF
BILIRUB SERPL-MCNC: 0.7 MG/DL (ref 0.2–1)
BILIRUB UR QL: NEGATIVE
BLOOD GROUP ANTIBODIES SERPL: NORMAL
BUN SERPL-MCNC: 8 MG/DL (ref 6–20)
BUN/CREAT SERPL: 18 (ref 12–20)
CALCIUM SERPL-MCNC: 10.4 MG/DL (ref 8.5–10.1)
CHLORIDE SERPL-SCNC: 104 MMOL/L (ref 97–108)
CO2 SERPL-SCNC: 29 MMOL/L (ref 21–32)
COLOR UR: NORMAL
CREAT SERPL-MCNC: 0.45 MG/DL (ref 0.55–1.02)
EPITH CASTS URNS QL MICRO: NORMAL /LPF
ERYTHROCYTE [DISTWIDTH] IN BLOOD BY AUTOMATED COUNT: 12.1 % (ref 11.5–14.5)
EST. AVERAGE GLUCOSE BLD GHB EST-MCNC: 120 MG/DL
GLOBULIN SER CALC-MCNC: 4 G/DL (ref 2–4)
GLUCOSE SERPL-MCNC: 117 MG/DL (ref 65–100)
GLUCOSE UR STRIP.AUTO-MCNC: NEGATIVE MG/DL
HBA1C MFR BLD: 5.8 % (ref 4–5.6)
HCT VFR BLD AUTO: 46.4 % (ref 35–47)
HGB BLD-MCNC: 14.9 G/DL (ref 11.5–16)
HGB UR QL STRIP: NEGATIVE
HYALINE CASTS URNS QL MICRO: NORMAL /LPF (ref 0–2)
KETONES UR QL STRIP.AUTO: NEGATIVE MG/DL
LEUKOCYTE ESTERASE UR QL STRIP.AUTO: NEGATIVE
MCH RBC QN AUTO: 29.3 PG (ref 26–34)
MCHC RBC AUTO-ENTMCNC: 32.1 G/DL (ref 30–36.5)
MCV RBC AUTO: 91.2 FL (ref 80–99)
NITRITE UR QL STRIP.AUTO: NEGATIVE
NRBC # BLD: 0 K/UL (ref 0–0.01)
NRBC BLD-RTO: 0 PER 100 WBC
PH UR STRIP: 6.5 (ref 5–8)
PLATELET # BLD AUTO: 204 K/UL (ref 150–400)
PMV BLD AUTO: 10.4 FL (ref 8.9–12.9)
POTASSIUM SERPL-SCNC: 3.7 MMOL/L (ref 3.5–5.1)
PROT SERPL-MCNC: 8.5 G/DL (ref 6.4–8.2)
PROT UR STRIP-MCNC: NEGATIVE MG/DL
RBC # BLD AUTO: 5.09 M/UL (ref 3.8–5.2)
RBC #/AREA URNS HPF: NORMAL /HPF (ref 0–5)
SODIUM SERPL-SCNC: 138 MMOL/L (ref 136–145)
SP GR UR REFRACTOMETRY: 1.01
SPECIMEN EXP DATE BLD: NORMAL
URINE CULTURE IF INDICATED: NORMAL
UROBILINOGEN UR QL STRIP.AUTO: 0.2 EU/DL (ref 0.2–1)
WBC # BLD AUTO: 5.5 K/UL (ref 3.6–11)
WBC URNS QL MICRO: NORMAL /HPF (ref 0–4)

## 2023-06-27 PROCEDURE — 81001 URINALYSIS AUTO W/SCOPE: CPT

## 2023-06-27 PROCEDURE — 86850 RBC ANTIBODY SCREEN: CPT

## 2023-06-27 PROCEDURE — 86901 BLOOD TYPING SEROLOGIC RH(D): CPT

## 2023-06-27 PROCEDURE — 83036 HEMOGLOBIN GLYCOSYLATED A1C: CPT

## 2023-06-27 PROCEDURE — 80053 COMPREHEN METABOLIC PANEL: CPT

## 2023-06-27 PROCEDURE — 85027 COMPLETE CBC AUTOMATED: CPT

## 2023-06-27 PROCEDURE — 86900 BLOOD TYPING SEROLOGIC ABO: CPT

## 2023-06-27 PROCEDURE — 36415 COLL VENOUS BLD VENIPUNCTURE: CPT

## 2023-06-27 RX ORDER — CELECOXIB 200 MG/1
200 CAPSULE ORAL ONCE
OUTPATIENT
Start: 2023-07-05

## 2023-06-27 RX ORDER — CETIRIZINE HYDROCHLORIDE 10 MG/1
10 TABLET ORAL DAILY
COMMUNITY

## 2023-06-27 RX ORDER — ACETAMINOPHEN 500 MG
1000 TABLET ORAL ONCE
OUTPATIENT
Start: 2023-07-05

## 2023-06-27 RX ORDER — SODIUM CHLORIDE, SODIUM LACTATE, POTASSIUM CHLORIDE, CALCIUM CHLORIDE 600; 310; 30; 20 MG/100ML; MG/100ML; MG/100ML; MG/100ML
INJECTION, SOLUTION INTRAVENOUS CONTINUOUS
OUTPATIENT
Start: 2023-07-05

## 2023-06-27 RX ORDER — ASCORBIC ACID 500 MG
500 TABLET ORAL 2 TIMES DAILY
COMMUNITY

## 2023-06-27 ASSESSMENT — PAIN SCALES - GENERAL: PAINLEVEL_OUTOF10: 3

## 2023-06-27 ASSESSMENT — PAIN DESCRIPTION - ORIENTATION: ORIENTATION: RIGHT

## 2023-06-27 ASSESSMENT — PAIN DESCRIPTION - LOCATION: LOCATION: KNEE

## 2023-06-27 ASSESSMENT — PAIN DESCRIPTION - DESCRIPTORS: DESCRIPTORS: ACHING

## 2023-06-28 LAB
BACTERIA SPEC CULT: NORMAL
BACTERIA SPEC CULT: NORMAL
SERVICE CMNT-IMP: NORMAL

## 2023-07-01 ENCOUNTER — ANESTHESIA EVENT (OUTPATIENT)
Facility: HOSPITAL | Age: 83
End: 2023-07-01
Payer: MEDICARE

## 2023-07-05 ENCOUNTER — APPOINTMENT (OUTPATIENT)
Facility: HOSPITAL | Age: 83
End: 2023-07-05
Attending: ORTHOPAEDIC SURGERY
Payer: MEDICARE

## 2023-07-05 ENCOUNTER — HOSPITAL ENCOUNTER (OUTPATIENT)
Facility: HOSPITAL | Age: 83
Setting detail: OBSERVATION
Discharge: HOME | End: 2023-07-05
Attending: ORTHOPAEDIC SURGERY | Admitting: ORTHOPAEDIC SURGERY
Payer: MEDICARE

## 2023-07-05 ENCOUNTER — ANESTHESIA (OUTPATIENT)
Facility: HOSPITAL | Age: 83
End: 2023-07-05
Payer: MEDICARE

## 2023-07-05 VITALS
BODY MASS INDEX: 25.22 KG/M2 | TEMPERATURE: 97.2 F | HEIGHT: 61 IN | HEART RATE: 77 BPM | SYSTOLIC BLOOD PRESSURE: 143 MMHG | WEIGHT: 133.6 LBS | RESPIRATION RATE: 18 BRPM | DIASTOLIC BLOOD PRESSURE: 85 MMHG | OXYGEN SATURATION: 98 %

## 2023-07-05 DIAGNOSIS — Z96.651 S/P TOTAL KNEE REPLACEMENT, RIGHT: Primary | ICD-10-CM

## 2023-07-05 PROBLEM — M17.11 PRIMARY OSTEOARTHRITIS OF RIGHT KNEE: Status: ACTIVE | Noted: 2023-07-05

## 2023-07-05 PROBLEM — M17.11 LOCALIZED OSTEOARTHRITIS OF RIGHT KNEE: Status: ACTIVE | Noted: 2023-07-05

## 2023-07-05 LAB — INR BLD: 1.5

## 2023-07-05 PROCEDURE — 6360000002 HC RX W HCPCS: Performed by: ORTHOPAEDIC SURGERY

## 2023-07-05 PROCEDURE — 7100000001 HC PACU RECOVERY - ADDTL 15 MIN: Performed by: ORTHOPAEDIC SURGERY

## 2023-07-05 PROCEDURE — 3700000000 HC ANESTHESIA ATTENDED CARE: Performed by: ORTHOPAEDIC SURGERY

## 2023-07-05 PROCEDURE — 2500000003 HC RX 250 WO HCPCS: Performed by: ANESTHESIOLOGY

## 2023-07-05 PROCEDURE — G0378 HOSPITAL OBSERVATION PER HR: HCPCS

## 2023-07-05 PROCEDURE — 97161 PT EVAL LOW COMPLEX 20 MIN: CPT

## 2023-07-05 PROCEDURE — C1713 ANCHOR/SCREW BN/BN,TIS/BN: HCPCS | Performed by: ORTHOPAEDIC SURGERY

## 2023-07-05 PROCEDURE — 2580000003 HC RX 258: Performed by: ANESTHESIOLOGY

## 2023-07-05 PROCEDURE — 2500000003 HC RX 250 WO HCPCS: Performed by: ORTHOPAEDIC SURGERY

## 2023-07-05 PROCEDURE — 6360000002 HC RX W HCPCS: Performed by: ANESTHESIOLOGY

## 2023-07-05 PROCEDURE — 3600000005 HC SURGERY LEVEL 5 BASE: Performed by: ORTHOPAEDIC SURGERY

## 2023-07-05 PROCEDURE — C1776 JOINT DEVICE (IMPLANTABLE): HCPCS | Performed by: ORTHOPAEDIC SURGERY

## 2023-07-05 PROCEDURE — 7100000000 HC PACU RECOVERY - FIRST 15 MIN: Performed by: ORTHOPAEDIC SURGERY

## 2023-07-05 PROCEDURE — 6360000002 HC RX W HCPCS: Performed by: NURSE ANESTHETIST, CERTIFIED REGISTERED

## 2023-07-05 PROCEDURE — 2500000003 HC RX 250 WO HCPCS: Performed by: NURSE ANESTHETIST, CERTIFIED REGISTERED

## 2023-07-05 PROCEDURE — 85610 PROTHROMBIN TIME: CPT

## 2023-07-05 PROCEDURE — 73560 X-RAY EXAM OF KNEE 1 OR 2: CPT

## 2023-07-05 PROCEDURE — A4217 STERILE WATER/SALINE, 500 ML: HCPCS | Performed by: ORTHOPAEDIC SURGERY

## 2023-07-05 PROCEDURE — 2580000003 HC RX 258: Performed by: ORTHOPAEDIC SURGERY

## 2023-07-05 PROCEDURE — 2720000010 HC SURG SUPPLY STERILE: Performed by: ORTHOPAEDIC SURGERY

## 2023-07-05 PROCEDURE — 2709999900 HC NON-CHARGEABLE SUPPLY: Performed by: ORTHOPAEDIC SURGERY

## 2023-07-05 PROCEDURE — 6370000000 HC RX 637 (ALT 250 FOR IP): Performed by: ORTHOPAEDIC SURGERY

## 2023-07-05 PROCEDURE — 3600000015 HC SURGERY LEVEL 5 ADDTL 15MIN: Performed by: ORTHOPAEDIC SURGERY

## 2023-07-05 PROCEDURE — 64447 NJX AA&/STRD FEMORAL NRV IMG: CPT | Performed by: ANESTHESIOLOGY

## 2023-07-05 PROCEDURE — 3700000001 HC ADD 15 MINUTES (ANESTHESIA): Performed by: ORTHOPAEDIC SURGERY

## 2023-07-05 PROCEDURE — 97530 THERAPEUTIC ACTIVITIES: CPT

## 2023-07-05 PROCEDURE — 97116 GAIT TRAINING THERAPY: CPT

## 2023-07-05 DEVICE — TIBIAL BEARING INSERT - CS
Type: IMPLANTABLE DEVICE | Site: KNEE | Status: FUNCTIONAL
Brand: TRIATHLON

## 2023-07-05 DEVICE — CEMENT BNE 20ML 40GM ACRYL RESIN HI VISC RADPQ FAST SET: Type: IMPLANTABLE DEVICE | Site: KNEE | Status: FUNCTIONAL

## 2023-07-05 DEVICE — COMPONENT PART KNEE CAPPED K1 STRYKER: Type: IMPLANTABLE DEVICE | Status: FUNCTIONAL

## 2023-07-05 DEVICE — SYMMETRIC PATELLA
Type: IMPLANTABLE DEVICE | Site: KNEE | Status: FUNCTIONAL
Brand: TRIATHLON

## 2023-07-05 DEVICE — UNIVERSAL TIBIAL BASEPLATE
Type: IMPLANTABLE DEVICE | Site: KNEE | Status: FUNCTIONAL
Brand: TRIATHLON

## 2023-07-05 DEVICE — CRUCIATE RETAINING FEMORAL
Type: IMPLANTABLE DEVICE | Site: KNEE | Status: FUNCTIONAL
Brand: TRIATHLON

## 2023-07-05 RX ORDER — MORPHINE SULFATE 4 MG/ML
2 INJECTION, SOLUTION INTRAMUSCULAR; INTRAVENOUS
Status: DISCONTINUED | OUTPATIENT
Start: 2023-07-05 | End: 2023-07-05 | Stop reason: HOSPADM

## 2023-07-05 RX ORDER — SODIUM CHLORIDE 0.9 % (FLUSH) 0.9 %
5-40 SYRINGE (ML) INJECTION PRN
Status: DISCONTINUED | OUTPATIENT
Start: 2023-07-05 | End: 2023-07-05

## 2023-07-05 RX ORDER — ACETAMINOPHEN 500 MG
1000 TABLET ORAL EVERY 8 HOURS SCHEDULED
Qty: 120 TABLET | Refills: 3 | Status: SHIPPED | OUTPATIENT
Start: 2023-07-05 | End: 2023-07-05 | Stop reason: SDUPTHER

## 2023-07-05 RX ORDER — OXYBUTYNIN CHLORIDE 5 MG/1
15 TABLET, EXTENDED RELEASE ORAL DAILY
Status: DISCONTINUED | OUTPATIENT
Start: 2023-07-06 | End: 2023-07-05 | Stop reason: HOSPADM

## 2023-07-05 RX ORDER — ONDANSETRON 2 MG/ML
4 INJECTION INTRAMUSCULAR; INTRAVENOUS
Status: DISCONTINUED | OUTPATIENT
Start: 2023-07-05 | End: 2023-07-05

## 2023-07-05 RX ORDER — TRAMADOL HYDROCHLORIDE 50 MG/1
50 TABLET ORAL EVERY 6 HOURS PRN
Qty: 28 TABLET | Refills: 0 | Status: SHIPPED | OUTPATIENT
Start: 2023-07-05 | End: 2023-07-12

## 2023-07-05 RX ORDER — TRANEXAMIC ACID 100 MG/ML
INJECTION, SOLUTION INTRAVENOUS PRN
Status: DISCONTINUED | OUTPATIENT
Start: 2023-07-05 | End: 2023-07-05 | Stop reason: SDUPTHER

## 2023-07-05 RX ORDER — SENNA AND DOCUSATE SODIUM 50; 8.6 MG/1; MG/1
1 TABLET, FILM COATED ORAL 2 TIMES DAILY
Qty: 28 TABLET | Refills: 0 | Status: SHIPPED | OUTPATIENT
Start: 2023-07-05 | End: 2023-07-05 | Stop reason: SDUPTHER

## 2023-07-05 RX ORDER — ACETAMINOPHEN 500 MG
1000 TABLET ORAL ONCE
Status: COMPLETED | OUTPATIENT
Start: 2023-07-05 | End: 2023-07-05

## 2023-07-05 RX ORDER — POLYETHYLENE GLYCOL 3350 17 G/17G
17 POWDER, FOR SOLUTION ORAL DAILY
Qty: 7 EACH | Refills: 0 | Status: SHIPPED | OUTPATIENT
Start: 2023-07-06 | End: 2023-07-13

## 2023-07-05 RX ORDER — OXYCODONE HYDROCHLORIDE 5 MG/1
5 TABLET ORAL EVERY 4 HOURS PRN
Status: DISCONTINUED | OUTPATIENT
Start: 2023-07-05 | End: 2023-07-05 | Stop reason: HOSPADM

## 2023-07-05 RX ORDER — SODIUM CHLORIDE 9 MG/ML
INJECTION, SOLUTION INTRAVENOUS PRN
Status: DISCONTINUED | OUTPATIENT
Start: 2023-07-05 | End: 2023-07-05

## 2023-07-05 RX ORDER — PROCHLORPERAZINE EDISYLATE 5 MG/ML
5 INJECTION INTRAMUSCULAR; INTRAVENOUS
Status: DISCONTINUED | OUTPATIENT
Start: 2023-07-05 | End: 2023-07-05

## 2023-07-05 RX ORDER — ENOXAPARIN SODIUM 100 MG/ML
40 INJECTION SUBCUTANEOUS DAILY
Status: DISCONTINUED | OUTPATIENT
Start: 2023-07-06 | End: 2023-07-05 | Stop reason: HOSPADM

## 2023-07-05 RX ORDER — SODIUM CHLORIDE, SODIUM LACTATE, POTASSIUM CHLORIDE, CALCIUM CHLORIDE 600; 310; 30; 20 MG/100ML; MG/100ML; MG/100ML; MG/100ML
INJECTION, SOLUTION INTRAVENOUS CONTINUOUS
Status: DISCONTINUED | OUTPATIENT
Start: 2023-07-05 | End: 2023-07-05 | Stop reason: HOSPADM

## 2023-07-05 RX ORDER — SODIUM CHLORIDE 0.9 % (FLUSH) 0.9 %
5-40 SYRINGE (ML) INJECTION EVERY 12 HOURS SCHEDULED
Status: DISCONTINUED | OUTPATIENT
Start: 2023-07-05 | End: 2023-07-05

## 2023-07-05 RX ORDER — SODIUM CHLORIDE 9 MG/ML
INJECTION, SOLUTION INTRAVENOUS PRN
Status: DISCONTINUED | OUTPATIENT
Start: 2023-07-05 | End: 2023-07-05 | Stop reason: HOSPADM

## 2023-07-05 RX ORDER — SODIUM CHLORIDE 9 MG/ML
INJECTION, SOLUTION INTRAVENOUS CONTINUOUS
Status: DISCONTINUED | OUTPATIENT
Start: 2023-07-05 | End: 2023-07-05 | Stop reason: HOSPADM

## 2023-07-05 RX ORDER — POLYETHYLENE GLYCOL 3350 17 G/17G
17 POWDER, FOR SOLUTION ORAL DAILY
Qty: 14 EACH | Refills: 0 | Status: SHIPPED | OUTPATIENT
Start: 2023-07-06 | End: 2023-07-05 | Stop reason: SDUPTHER

## 2023-07-05 RX ORDER — LIDOCAINE HYDROCHLORIDE 20 MG/ML
INJECTION, SOLUTION EPIDURAL; INFILTRATION; INTRACAUDAL; PERINEURAL PRN
Status: DISCONTINUED | OUTPATIENT
Start: 2023-07-05 | End: 2023-07-05 | Stop reason: SDUPTHER

## 2023-07-05 RX ORDER — SODIUM CHLORIDE 0.9 % (FLUSH) 0.9 %
5-40 SYRINGE (ML) INJECTION PRN
Status: DISCONTINUED | OUTPATIENT
Start: 2023-07-05 | End: 2023-07-05 | Stop reason: HOSPADM

## 2023-07-05 RX ORDER — HYDROMORPHONE HYDROCHLORIDE 1 MG/ML
0.5 INJECTION, SOLUTION INTRAMUSCULAR; INTRAVENOUS; SUBCUTANEOUS EVERY 5 MIN PRN
Status: COMPLETED | OUTPATIENT
Start: 2023-07-05 | End: 2023-07-05

## 2023-07-05 RX ORDER — SUCCINYLCHOLINE/SOD CL,ISO/PF 200MG/10ML
SYRINGE (ML) INTRAVENOUS PRN
Status: DISCONTINUED | OUTPATIENT
Start: 2023-07-05 | End: 2023-07-05 | Stop reason: SDUPTHER

## 2023-07-05 RX ORDER — PROPOFOL 10 MG/ML
INJECTION, EMULSION INTRAVENOUS PRN
Status: DISCONTINUED | OUTPATIENT
Start: 2023-07-05 | End: 2023-07-05 | Stop reason: SDUPTHER

## 2023-07-05 RX ORDER — PHENYLEPHRINE HCL IN 0.9% NACL 0.4MG/10ML
SYRINGE (ML) INTRAVENOUS PRN
Status: DISCONTINUED | OUTPATIENT
Start: 2023-07-05 | End: 2023-07-05 | Stop reason: SDUPTHER

## 2023-07-05 RX ORDER — ONDANSETRON 2 MG/ML
4 INJECTION INTRAMUSCULAR; INTRAVENOUS EVERY 6 HOURS PRN
Status: DISCONTINUED | OUTPATIENT
Start: 2023-07-05 | End: 2023-07-05 | Stop reason: HOSPADM

## 2023-07-05 RX ORDER — ONDANSETRON 4 MG/1
4 TABLET, ORALLY DISINTEGRATING ORAL EVERY 8 HOURS PRN
Qty: 10 TABLET | Refills: 0 | Status: SHIPPED | OUTPATIENT
Start: 2023-07-05

## 2023-07-05 RX ORDER — CELECOXIB 200 MG/1
200 CAPSULE ORAL ONCE
Status: COMPLETED | OUTPATIENT
Start: 2023-07-05 | End: 2023-07-05

## 2023-07-05 RX ORDER — 0.9 % SODIUM CHLORIDE 0.9 %
500 INTRAVENOUS SOLUTION INTRAVENOUS ONCE AS NEEDED
Status: DISCONTINUED | OUTPATIENT
Start: 2023-07-05 | End: 2023-07-05 | Stop reason: HOSPADM

## 2023-07-05 RX ORDER — ROPIVACAINE HYDROCHLORIDE 5 MG/ML
INJECTION, SOLUTION EPIDURAL; INFILTRATION; PERINEURAL PRN
Status: DISCONTINUED | OUTPATIENT
Start: 2023-07-05 | End: 2023-07-05 | Stop reason: ALTCHOICE

## 2023-07-05 RX ORDER — BUPIVACAINE HYDROCHLORIDE 2.5 MG/ML
INJECTION, SOLUTION EPIDURAL; INFILTRATION; INTRACAUDAL PRN
Status: DISCONTINUED | OUTPATIENT
Start: 2023-07-05 | End: 2023-07-05 | Stop reason: SDUPTHER

## 2023-07-05 RX ORDER — SODIUM CHLORIDE 0.9 % (FLUSH) 0.9 %
5-40 SYRINGE (ML) INJECTION EVERY 12 HOURS SCHEDULED
Status: DISCONTINUED | OUTPATIENT
Start: 2023-07-05 | End: 2023-07-05 | Stop reason: HOSPADM

## 2023-07-05 RX ORDER — ACETAMINOPHEN 500 MG
1000 TABLET ORAL EVERY 6 HOURS PRN
Qty: 120 TABLET | Refills: 3 | Status: SHIPPED | OUTPATIENT
Start: 2023-07-05

## 2023-07-05 RX ORDER — LANOLIN ALCOHOL/MO/W.PET/CERES
400 CREAM (GRAM) TOPICAL
Status: DISCONTINUED | OUTPATIENT
Start: 2023-07-05 | End: 2023-07-05 | Stop reason: HOSPADM

## 2023-07-05 RX ORDER — DEXAMETHASONE SODIUM PHOSPHATE 4 MG/ML
INJECTION, SOLUTION INTRA-ARTICULAR; INTRALESIONAL; INTRAMUSCULAR; INTRAVENOUS; SOFT TISSUE PRN
Status: DISCONTINUED | OUTPATIENT
Start: 2023-07-05 | End: 2023-07-05 | Stop reason: SDUPTHER

## 2023-07-05 RX ORDER — FENTANYL CITRATE 50 UG/ML
25 INJECTION, SOLUTION INTRAMUSCULAR; INTRAVENOUS EVERY 5 MIN PRN
Status: COMPLETED | OUTPATIENT
Start: 2023-07-05 | End: 2023-07-05

## 2023-07-05 RX ORDER — OXYCODONE HYDROCHLORIDE 5 MG/1
10 TABLET ORAL EVERY 4 HOURS PRN
Status: DISCONTINUED | OUTPATIENT
Start: 2023-07-05 | End: 2023-07-05

## 2023-07-05 RX ORDER — DIPHENHYDRAMINE HCL 25 MG
25 CAPSULE ORAL EVERY 6 HOURS PRN
Status: DISCONTINUED | OUTPATIENT
Start: 2023-07-05 | End: 2023-07-05 | Stop reason: HOSPADM

## 2023-07-05 RX ORDER — POLYETHYLENE GLYCOL 3350 17 G/17G
17 POWDER, FOR SOLUTION ORAL DAILY
Status: DISCONTINUED | OUTPATIENT
Start: 2023-07-05 | End: 2023-07-05 | Stop reason: HOSPADM

## 2023-07-05 RX ORDER — SENNA AND DOCUSATE SODIUM 50; 8.6 MG/1; MG/1
1 TABLET, FILM COATED ORAL 2 TIMES DAILY
Qty: 28 TABLET | Refills: 1 | Status: SHIPPED | OUTPATIENT
Start: 2023-07-05 | End: 2023-08-02

## 2023-07-05 RX ORDER — BISACODYL 10 MG
10 SUPPOSITORY, RECTAL RECTAL DAILY PRN
Status: DISCONTINUED | OUTPATIENT
Start: 2023-07-05 | End: 2023-07-05 | Stop reason: HOSPADM

## 2023-07-05 RX ORDER — TRANEXAMIC ACID 100 MG/ML
INJECTION, SOLUTION INTRAVENOUS PRN
Status: DISCONTINUED | OUTPATIENT
Start: 2023-07-05 | End: 2023-07-05 | Stop reason: ALTCHOICE

## 2023-07-05 RX ORDER — SENNA AND DOCUSATE SODIUM 50; 8.6 MG/1; MG/1
1 TABLET, FILM COATED ORAL 2 TIMES DAILY
Status: DISCONTINUED | OUTPATIENT
Start: 2023-07-05 | End: 2023-07-05 | Stop reason: HOSPADM

## 2023-07-05 RX ORDER — PROCHLORPERAZINE EDISYLATE 5 MG/ML
2.5 INJECTION INTRAMUSCULAR; INTRAVENOUS
Status: DISCONTINUED | OUTPATIENT
Start: 2023-07-05 | End: 2023-07-05 | Stop reason: HOSPADM

## 2023-07-05 RX ORDER — DIPHENHYDRAMINE HYDROCHLORIDE 50 MG/ML
25 INJECTION INTRAMUSCULAR; INTRAVENOUS EVERY 6 HOURS PRN
Status: DISCONTINUED | OUTPATIENT
Start: 2023-07-05 | End: 2023-07-05 | Stop reason: HOSPADM

## 2023-07-05 RX ORDER — ACETAMINOPHEN 500 MG
1000 TABLET ORAL EVERY 8 HOURS SCHEDULED
Status: DISCONTINUED | OUTPATIENT
Start: 2023-07-05 | End: 2023-07-05 | Stop reason: HOSPADM

## 2023-07-05 RX ORDER — FAMOTIDINE 20 MG/1
20 TABLET, FILM COATED ORAL 2 TIMES DAILY
Status: DISCONTINUED | OUTPATIENT
Start: 2023-07-05 | End: 2023-07-05 | Stop reason: HOSPADM

## 2023-07-05 RX ORDER — OXYCODONE HYDROCHLORIDE 5 MG/1
5 TABLET ORAL
Status: DISCONTINUED | OUTPATIENT
Start: 2023-07-05 | End: 2023-07-05

## 2023-07-05 RX ORDER — ONDANSETRON 4 MG/1
4 TABLET, ORALLY DISINTEGRATING ORAL EVERY 8 HOURS PRN
Status: DISCONTINUED | OUTPATIENT
Start: 2023-07-05 | End: 2023-07-05 | Stop reason: HOSPADM

## 2023-07-05 RX ORDER — ONDANSETRON 2 MG/ML
INJECTION INTRAMUSCULAR; INTRAVENOUS PRN
Status: DISCONTINUED | OUTPATIENT
Start: 2023-07-05 | End: 2023-07-05 | Stop reason: SDUPTHER

## 2023-07-05 RX ORDER — FENTANYL CITRATE 50 UG/ML
INJECTION, SOLUTION INTRAMUSCULAR; INTRAVENOUS PRN
Status: DISCONTINUED | OUTPATIENT
Start: 2023-07-05 | End: 2023-07-05 | Stop reason: SDUPTHER

## 2023-07-05 RX ADMIN — ACETAMINOPHEN 1000 MG: 500 TABLET ORAL at 13:40

## 2023-07-05 RX ADMIN — FENTANYL CITRATE 25 MCG: 50 INJECTION, SOLUTION INTRAMUSCULAR; INTRAVENOUS at 11:30

## 2023-07-05 RX ADMIN — BUPIVACAINE HYDROCHLORIDE 30 ML: 2.5 INJECTION, SOLUTION EPIDURAL; INFILTRATION; INTRACAUDAL; PERINEURAL at 09:14

## 2023-07-05 RX ADMIN — WATER 2000 MG: 1 INJECTION INTRAMUSCULAR; INTRAVENOUS; SUBCUTANEOUS at 16:33

## 2023-07-05 RX ADMIN — Medication 120 MCG: at 10:39

## 2023-07-05 RX ADMIN — WATER 2000 MG: 1 INJECTION INTRAMUSCULAR; INTRAVENOUS; SUBCUTANEOUS at 09:26

## 2023-07-05 RX ADMIN — HYDROMORPHONE HYDROCHLORIDE 0.5 MG: 1 INJECTION, SOLUTION INTRAMUSCULAR; INTRAVENOUS; SUBCUTANEOUS at 11:45

## 2023-07-05 RX ADMIN — FAMOTIDINE 20 MG: 20 TABLET, FILM COATED ORAL at 13:40

## 2023-07-05 RX ADMIN — SODIUM CHLORIDE, POTASSIUM CHLORIDE, SODIUM LACTATE AND CALCIUM CHLORIDE: 600; 310; 30; 20 INJECTION, SOLUTION INTRAVENOUS at 09:21

## 2023-07-05 RX ADMIN — SENNOSIDES AND DOCUSATE SODIUM 1 TABLET: 50; 8.6 TABLET ORAL at 13:40

## 2023-07-05 RX ADMIN — HYDROMORPHONE HYDROCHLORIDE 0.5 MG: 1 INJECTION, SOLUTION INTRAMUSCULAR; INTRAVENOUS; SUBCUTANEOUS at 12:00

## 2023-07-05 RX ADMIN — TRANEXAMIC ACID 1000 MG: 100 INJECTION, SOLUTION INTRAVENOUS at 09:35

## 2023-07-05 RX ADMIN — PROPOFOL 120 MG: 10 INJECTION, EMULSION INTRAVENOUS at 09:29

## 2023-07-05 RX ADMIN — FENTANYL CITRATE 25 MCG: 50 INJECTION, SOLUTION INTRAMUSCULAR; INTRAVENOUS at 11:40

## 2023-07-05 RX ADMIN — FENTANYL CITRATE 25 MCG: 50 INJECTION, SOLUTION INTRAMUSCULAR; INTRAVENOUS at 09:29

## 2023-07-05 RX ADMIN — HYDROMORPHONE HYDROCHLORIDE 0.5 MG: 1 INJECTION, SOLUTION INTRAMUSCULAR; INTRAVENOUS; SUBCUTANEOUS at 11:07

## 2023-07-05 RX ADMIN — FENTANYL CITRATE 25 MCG: 50 INJECTION, SOLUTION INTRAMUSCULAR; INTRAVENOUS at 11:15

## 2023-07-05 RX ADMIN — TRANEXAMIC ACID 1000 MG: 100 INJECTION, SOLUTION INTRAVENOUS at 10:29

## 2023-07-05 RX ADMIN — Medication 60 MG: at 09:30

## 2023-07-05 RX ADMIN — Medication 3 AMPULE: at 08:22

## 2023-07-05 RX ADMIN — ACETAMINOPHEN 1000 MG: 500 TABLET ORAL at 08:22

## 2023-07-05 RX ADMIN — HYDROMORPHONE HYDROCHLORIDE 0.5 MG: 1 INJECTION, SOLUTION INTRAMUSCULAR; INTRAVENOUS; SUBCUTANEOUS at 10:58

## 2023-07-05 RX ADMIN — CELECOXIB 200 MG: 200 CAPSULE ORAL at 08:22

## 2023-07-05 RX ADMIN — FENTANYL CITRATE 50 MCG: 50 INJECTION, SOLUTION INTRAMUSCULAR; INTRAVENOUS at 10:07

## 2023-07-05 RX ADMIN — OXYCODONE HYDROCHLORIDE 5 MG: 5 TABLET ORAL at 13:40

## 2023-07-05 RX ADMIN — ONDANSETRON 4 MG: 4 TABLET, ORALLY DISINTEGRATING ORAL at 13:46

## 2023-07-05 RX ADMIN — DEXAMETHASONE SODIUM PHOSPHATE 10 MG: 4 INJECTION, SOLUTION INTRAMUSCULAR; INTRAVENOUS at 09:41

## 2023-07-05 RX ADMIN — ONDANSETRON HYDROCHLORIDE 4 MG: 2 INJECTION, SOLUTION INTRAMUSCULAR; INTRAVENOUS at 09:53

## 2023-07-05 RX ADMIN — LIDOCAINE HYDROCHLORIDE 60 MG: 20 INJECTION, SOLUTION EPIDURAL; INFILTRATION; INTRACAUDAL; PERINEURAL at 09:41

## 2023-07-05 RX ADMIN — FENTANYL CITRATE 50 MCG: 50 INJECTION, SOLUTION INTRAMUSCULAR; INTRAVENOUS at 09:51

## 2023-07-05 RX ADMIN — FENTANYL CITRATE 50 MCG: 50 INJECTION, SOLUTION INTRAMUSCULAR; INTRAVENOUS at 10:09

## 2023-07-05 RX ADMIN — FENTANYL CITRATE 25 MCG: 50 INJECTION, SOLUTION INTRAMUSCULAR; INTRAVENOUS at 11:20

## 2023-07-05 RX ADMIN — FENTANYL CITRATE 25 MCG: 50 INJECTION, SOLUTION INTRAMUSCULAR; INTRAVENOUS at 09:24

## 2023-07-05 RX ADMIN — SODIUM CHLORIDE, POTASSIUM CHLORIDE, SODIUM LACTATE AND CALCIUM CHLORIDE: 600; 310; 30; 20 INJECTION, SOLUTION INTRAVENOUS at 10:27

## 2023-07-05 RX ADMIN — POLYETHYLENE GLYCOL 3350 17 G: 17 POWDER, FOR SOLUTION ORAL at 13:40

## 2023-07-05 ASSESSMENT — PAIN DESCRIPTION - LOCATION
LOCATION: KNEE

## 2023-07-05 ASSESSMENT — PAIN DESCRIPTION - DESCRIPTORS
DESCRIPTORS: DISCOMFORT
DESCRIPTORS: ACHING;THROBBING
DESCRIPTORS: ACHING
DESCRIPTORS: ACHING;THROBBING
DESCRIPTORS: ACHING
DESCRIPTORS: ACHING;THROBBING
DESCRIPTORS: ACHING;THROBBING

## 2023-07-05 ASSESSMENT — PAIN DESCRIPTION - ORIENTATION
ORIENTATION: RIGHT

## 2023-07-05 ASSESSMENT — PAIN SCALES - GENERAL
PAINLEVEL_OUTOF10: 8
PAINLEVEL_OUTOF10: 10
PAINLEVEL_OUTOF10: 10
PAINLEVEL_OUTOF10: 0
PAINLEVEL_OUTOF10: 10
PAINLEVEL_OUTOF10: 9
PAINLEVEL_OUTOF10: 8
PAINLEVEL_OUTOF10: 4
PAINLEVEL_OUTOF10: 10
PAINLEVEL_OUTOF10: 10
PAINLEVEL_OUTOF10: 5

## 2023-07-05 ASSESSMENT — PAIN - FUNCTIONAL ASSESSMENT: PAIN_FUNCTIONAL_ASSESSMENT: 0-10

## 2023-07-05 ASSESSMENT — PAIN DESCRIPTION - PAIN TYPE
TYPE: SURGICAL PAIN
TYPE: SURGICAL PAIN

## 2023-07-05 NOTE — ANESTHESIA PROCEDURE NOTES
Peripheral Block    Patient location during procedure: pre-op  Reason for block: post-op pain management and at surgeon's request  Start time: 7/5/2023 9:10 AM  End time: 7/5/2023 9:15 AM  Staffing  Performed: anesthesiologist   Anesthesiologist: Kaylee Hutton MD  Preanesthetic Checklist  Completed: patient identified, IV checked, site marked, risks and benefits discussed, surgical/procedural consents, equipment checked, pre-op evaluation, timeout performed, anesthesia consent given, oxygen available, monitors applied/VS acknowledged and fire risk safety assessment completed and verbalized  Peripheral Block   Patient position: supine  Prep: ChloraPrep  Provider prep: mask and sterile gloves  Patient monitoring: cardiac monitor, continuous pulse ox, continuous capnometry, frequent blood pressure checks and IV access  Block type: Saphenous  Laterality: right  Injection technique: single-shot  Guidance: ultrasound guided    Needle   Needle type: insulated echogenic nerve stimulator needle   Needle gauge: 21 G  Needle localization: anatomical landmarks and ultrasound guidance  Needle length: 10 cm  Assessment   Injection assessment: negative aspiration for heme, no paresthesia on injection, local visualized surrounding nerve on ultrasound and no intravascular symptoms  Paresthesia pain: none  Slow fractionated injection: yes  Hemodynamics: stable  Real-time US image taken/store: yes  Outcomes: uncomplicated and patient tolerated procedure well

## 2023-07-05 NOTE — ANESTHESIA POSTPROCEDURE EVALUATION
Department of Anesthesiology  Postprocedure Note    Patient: Elier Garvey  MRN: [id number]  YOB: 1940  Date of evaluation: 7/5/2023      Procedure Summary     Date: 07/05/23 Room / Location: [facility] MAIN OR  / [facility] MAIN OR    Anesthesia Start: 0926 Anesthesia Stop: 1054    Procedure: RIGHT TOTAL KNEE ARTHROPLASTY WITH MAKOPLASTY (Right: Knee) Diagnosis:       Primary osteoarthritis of right knee      (Primary osteoarthritis of right knee [M17.11])    Providers:  Beulah Bautista MD Responsible Provider: Candy Hsu MD    Anesthesia Type: General, Regional ASA Status: 2          Anesthesia Type: General, Regional    Royal Phase I: Royal Score: 10    Royal Phase II:        Anesthesia Post Evaluation    Patient location during evaluation: PACU  Patient participation: complete - patient cannot participate  Level of consciousness: awake  Pain score: 0  Airway patency: patent  Nausea & Vomiting: no nausea and no vomiting  Complications: no  Cardiovascular status: hemodynamically stable  Respiratory status: acceptable  Hydration status: stable

## 2023-07-05 NOTE — OP NOTE
OPERATIVE REPORT    FACILITY: [facility]    PATIENT NAME: Purvi Espino     DATE OF OPERATION: 7/5/23    PREOPERATIVE DIAGNOSIS: Right knee end stage osteoarthritis    POSTOPERATIVE DIAGNOSIS: same    SURGERIES PERFORMED:   Right total knee arthroplasty    ATTENDING PHYSICIAN: Aliza Castrejon MD    ASSISTANT: Veronika Wong    IMPLANTS:    Bailey Triathlon size 2 femur, 2 tibia, 9 mm CS poly, 29 mm patella    SPECIMENS:  none    OPERATIVE FINDINGS: End stage osteoarthritis. Stable total knee at conclusion. ANESTHESIA: spinal plus regional    FLUIDS: Please see anesthesia record    ESTIMATED BLOOD LOSS: 25 cc     TOURNIQUET TIME: 40 min at 250 mmHg    INDICATIONS FOR PROCEDURE: Purvi Espino is a 80 y.o. female who presented to clinic with right knee pain. Radiographs demonstrated advanced arthritic changes of the affected knee. They were counseled on the risks, benefits, and alternatives to surgery. Risks were outlined to include, but were not limited to: bleeding, infection, fracture, damage to blood vessels or nerves, hardware failure, loosening, continued pain, stiffness, leg length inequality, and medical risks including heart attack, stroke, blood clot, and even death. The patient elected to continue with the operation, and gave informed consent to do so. DETAILED DESCRIPTION OF PROCEDURE:   The patient was identified in the preoperative holding area. The operative site was marked. The nature of procedure was reviewed and informed consent was confirmed. The patient was evaluated by anesthesia and a regional block was completed. The patient was subsequently taken to the operating room and anesthesia was administered. The patient was positioned supine and a nonsterile thigh tourniquet was placed. The lower extremity was prepped and draped in a standard fashion. Preoperative antibiotics were administered. A time-out was performed. A standard midline incision was made.  Sharp dissection was taken down to

## 2023-07-09 ENCOUNTER — TELEPHONE (OUTPATIENT)
Facility: HOSPITAL | Age: 83
End: 2023-07-09

## 2024-02-13 ENCOUNTER — TRANSCRIBE ORDERS (OUTPATIENT)
Facility: HOSPITAL | Age: 84
End: 2024-02-13

## 2024-02-13 DIAGNOSIS — Z12.31 SCREENING MAMMOGRAM FOR BREAST CANCER: Primary | ICD-10-CM

## 2024-02-29 ENCOUNTER — HOSPITAL ENCOUNTER (OUTPATIENT)
Facility: HOSPITAL | Age: 84
Discharge: HOME | End: 2024-02-29
Payer: MEDICARE

## 2024-02-29 VITALS — BODY MASS INDEX: 25.13 KG/M2 | WEIGHT: 133 LBS

## 2024-02-29 DIAGNOSIS — Z12.31 SCREENING MAMMOGRAM FOR BREAST CANCER: ICD-10-CM

## 2024-02-29 PROCEDURE — 77063 BREAST TOMOSYNTHESIS BI: CPT

## 2025-02-19 ENCOUNTER — TRANSCRIBE ORDERS (OUTPATIENT)
Facility: HOSPITAL | Age: 85
End: 2025-02-19

## 2025-02-19 DIAGNOSIS — Z12.31 SCREENING MAMMOGRAM FOR BREAST CANCER: Primary | ICD-10-CM

## 2025-03-07 ENCOUNTER — HOSPITAL ENCOUNTER (OUTPATIENT)
Facility: HOSPITAL | Age: 85
Discharge: HOME | End: 2025-03-10
Payer: MEDICARE

## 2025-03-07 DIAGNOSIS — Z12.31 SCREENING MAMMOGRAM FOR BREAST CANCER: ICD-10-CM

## 2025-03-07 PROCEDURE — 77063 BREAST TOMOSYNTHESIS BI: CPT

## (undated) DEVICE — BLADE SURG SAW STD S STL OSC W/ SERR EDGE DISP

## (undated) DEVICE — BOOT POS LEG DEMAYO

## (undated) DEVICE — ZIMMER® STERILE DISPOSABLE TOURNIQUET CUFF WITH PROTECTIVE SLEEVE AND PLC, DUAL PORT, SINGLE BLADDER, 34 IN. (86 CM)

## (undated) DEVICE — GOWN,SIRUS,NONRNF,SETINSLV,2XL,18/CS: Brand: MEDLINE

## (undated) DEVICE — 3M™ IOBAN™ 2 ANTIMICROBIAL INCISE DRAPE 6648EZ: Brand: IOBAN™ 2

## (undated) DEVICE — SUTURE VCRL SZ 1 L36IN ABSRB UD L36MM CT-1 1/2 CIR J947H

## (undated) DEVICE — DRESSING WND ISLAND STD 4X10 IN MULT LAYR STRL SILVERLON

## (undated) DEVICE — SOLUTION IRRIG 1000ML STRL H2O USP PLAS POUR BTL

## (undated) DEVICE — Device

## (undated) DEVICE — DRAPE,U/ SHT,SPLIT,PLAS,STERIL: Brand: MEDLINE

## (undated) DEVICE — TRANSFER SET 3": Brand: MEDLINE INDUSTRIES, INC.

## (undated) DEVICE — GLOVE SURG SZ 85 L12IN FNGR THK79MIL GRN LTX FREE

## (undated) DEVICE — BOWL MED L 32OZ PLAS W/ MOLD GRAD EZ OPN PEEL PCH

## (undated) DEVICE — PIN BNE FIX L140MM DIA3.2MM SELF DRL

## (undated) DEVICE — LIQUIBAND RAPID ADHESIVE 36/CS 0.8ML: Brand: MEDLINE

## (undated) DEVICE — KIT TRK KNEE PROC VIZADISC

## (undated) DEVICE — NEEDLE SPNL L3.5IN PNK HUB S STL REG WALL FIT STYL W/ QNCKE

## (undated) DEVICE — BLADE SURG SAW S STL NAR OSC W/ SERR EDGE DISP

## (undated) DEVICE — SPONGE GZ W4XL4IN COT 12 PLY TYP VII WVN C FLD DSGN STERILE

## (undated) DEVICE — HOOD: Brand: FLYTE

## (undated) DEVICE — PIN BONE FIX L110MM DIA3.2MM

## (undated) DEVICE — DRAPE,ORTHOMAX,EXTREMITY: Brand: MEDLINE

## (undated) DEVICE — SUTURE STRATAFIX SZ 3-0 30CM NONABSORB UD 26MM FS 3/8 SXMP2B412

## (undated) DEVICE — GLOVE ORTHO 8   MSG9480

## (undated) DEVICE — TUBESET BNE PREP CO2 CARBOJET

## (undated) DEVICE — BANDAGE COMPR M W6INXL10YD WHT BGE VELC E MTRX HK AND LOOP

## (undated) DEVICE — SUTURE STRATAFIX SYMMETRIC SZ 1 L18IN ABSRB VLT CT1 L36CM SXPP1A404

## (undated) DEVICE — KIT DRP FOR RIO ROBOTIC ARM ASST SYS

## (undated) DEVICE — TOTAL JOINT-MRMC: Brand: MEDLINE INDUSTRIES, INC.

## (undated) DEVICE — ALCOHOL  RUBBING  70% ISOPROPYL  4-OZ

## (undated) DEVICE — BLADE, TONGUE, 6", STERILE: Brand: MEDLINE

## (undated) DEVICE — SUTURE MCRYL SZ 2-0 L36IN ABSRB UD L36MM CT-1 1/2 CIR Y945H

## (undated) DEVICE — APPLICATOR MEDICATED 26 CC SOLUTION HI LT ORNG CHLORAPREP

## (undated) DEVICE — MARKER SURG SKIN UTIL REGULAR/FINE 2 TIP W/ RUL AND 9 LBL

## (undated) DEVICE — HANDPIECE SET WITH COAXIAL HIGH FLOW TIP AND SUCTION TUBE: Brand: INTERPULSE